# Patient Record
Sex: FEMALE | Race: BLACK OR AFRICAN AMERICAN | Employment: OTHER | URBAN - METROPOLITAN AREA
[De-identification: names, ages, dates, MRNs, and addresses within clinical notes are randomized per-mention and may not be internally consistent; named-entity substitution may affect disease eponyms.]

---

## 2017-11-16 ENCOUNTER — OP HISTORICAL/CONVERTED ENCOUNTER (OUTPATIENT)
Dept: OTHER | Age: 57
End: 2017-11-16

## 2020-08-10 ENCOUNTER — OP HISTORICAL/CONVERTED ENCOUNTER (OUTPATIENT)
Dept: OTHER | Age: 60
End: 2020-08-10

## 2020-09-08 ENCOUNTER — OP HISTORICAL/CONVERTED ENCOUNTER (OUTPATIENT)
Dept: OTHER | Age: 60
End: 2020-09-08

## 2020-09-17 ENCOUNTER — HOSPITAL ENCOUNTER (OUTPATIENT)
Dept: MAMMOGRAPHY | Age: 60
Discharge: HOME | End: 2020-09-17
Attending: FAMILY MEDICINE
Payer: MEDICAID

## 2020-09-17 DIAGNOSIS — R92.1 BREAST CALCIFICATIONS: ICD-10-CM

## 2020-09-17 PROCEDURE — 88305 TISSUE EXAM BY PATHOLOGIST: CPT

## 2020-09-17 PROCEDURE — 77065 DX MAMMO INCL CAD UNI: CPT

## 2020-09-17 PROCEDURE — 19081 BX BREAST 1ST LESION STRTCTC: CPT

## 2020-09-17 RX ORDER — LIDOCAINE HYDROCHLORIDE AND EPINEPHRINE 10; 10 MG/ML; UG/ML
10 INJECTION, SOLUTION INFILTRATION; PERINEURAL ONCE
Status: DISPENSED | OUTPATIENT
Start: 2020-09-17 | End: 2020-09-17

## 2020-09-17 RX ORDER — LIDOCAINE HYDROCHLORIDE 10 MG/ML
3 INJECTION INFILTRATION; PERINEURAL ONCE
Status: DISPENSED | OUTPATIENT
Start: 2020-09-17 | End: 2020-09-17

## 2020-11-06 ENCOUNTER — OFFICE VISIT (OUTPATIENT)
Dept: SURGERY | Age: 60
End: 2020-11-06
Payer: MEDICAID

## 2020-11-06 VITALS
TEMPERATURE: 96.2 F | WEIGHT: 189.6 LBS | HEIGHT: 65 IN | BODY MASS INDEX: 31.59 KG/M2 | SYSTOLIC BLOOD PRESSURE: 180 MMHG | HEART RATE: 90 BPM | DIASTOLIC BLOOD PRESSURE: 102 MMHG | RESPIRATION RATE: 16 BRPM

## 2020-11-06 DIAGNOSIS — R92.8 ABNORMAL MAMMOGRAPHY: ICD-10-CM

## 2020-11-06 DIAGNOSIS — Z12.31 SCREENING MAMMOGRAM, ENCOUNTER FOR: ICD-10-CM

## 2020-11-06 DIAGNOSIS — N64.89 RADIAL SCAR OF BREAST: Primary | ICD-10-CM

## 2020-11-06 PROCEDURE — 99204 OFFICE O/P NEW MOD 45 MIN: CPT | Performed by: SURGERY

## 2020-11-06 NOTE — PROGRESS NOTES
1. Have you been to the ER, urgent care clinic since your last visit? Hospitalized since your last visit? No    2. Have you seen or consulted any other health care providers outside of the [address] [place] [address] since your last visit? Include any pap smears or colon screening.  No       New patient consult for abnormal mammogram.

## 2020-11-06 NOTE — LETTER
11/16/20 Patient: Alfonso Whipple  
YOB: 1960 Date of Visit: 11/6/2020 Karla Charles MD 
[address] [facility] [address] VIA Facsimile: [phone] Dear Karla Charles MD, Thank you for referring Ms. Alfonso Whipple to [facility] for evaluation. My notes for this consultation are attached. If you have questions, please do not hesitate to call me. I look forward to following your patient along with you. Sincerely, Walter Cordero MD

## 2020-11-10 ENCOUNTER — TRANSCRIBE ORDER (OUTPATIENT)
Dept: SCHEDULING | Age: 60
End: 2020-11-10

## 2020-11-10 DIAGNOSIS — Z12.31 SCREENING MAMMOGRAM FOR HIGH-RISK PATIENT: Primary | ICD-10-CM

## 2020-11-16 NOTE — PROGRESS NOTES
This is the first [address] [place] [address] visit for this 56y.o.-year-old woman who presents with left abnormal mammogram      HISTORY OF PRESENT ILLNESS: Ms Lilly Velasco is a 56y.o.-year-old woman who presents for left abnormal mammogram. She underwent biopsy of the left breast calcifications, which demonstrated a small radial scar. She denies any palpable masses, nipple discharge, skin changes, or axillary adenopathy. She denies breast pain. She does not have any significant past history for breast diseases or breast biopsy. Breast cancer risk factors:  Menarche at age 15    Age at first live birth: 14  postmenopausal.  OCP use: admits to  HRT use: denies  Infertility treatment:  denies    FAMILY HISTORY:  No cancers    History reviewed. No pertinent past medical history. History reviewed. No pertinent surgical history.       Social History     Socioeconomic History    Marital status: SINGLE     Spouse name: Not on file    Number of children: Not on file    Years of education: Not on file    Highest education level: Not on file   Tobacco Use    Smoking status: Never Smoker    Smokeless tobacco: Never Used         Not on File            REVIEW OF SYSTEMS:  General: normal, no unintentional weight loss  HEENT: normal, no lymphadenopathy  Cardiovascular: normal, no chest pain  Respiratory: no cough, shortness of breath, or wheezing  BREAST: no complaints  GI: normal, no blood in urine or stool  : normal, no hematuria  Musculoskeletal: no back pain  Integumentary: no abnormal skin lesions  Neuro: no memory changes, dizziness, loss of consciousness  Psych: no mood disorders, feels safe in a relationship      PHYSICAL EXAM:  Patient is a 56y.o.-year-old woman  General Appearance: healthy-appearing, no acute distress, ambulating normally  Head: normocephalic  Neck: supple and no masses  Lymph Nodes: no cervical LAD, supraclavicular LAD, or axillary LAD    BREASTS: symmetric  RIGHT BREAST: no erythema, induration, tenderness, ecchymosis, skin changes, abnormal secretions, or axillary lymphadenopathy and normal nipple appearance, no masses  LEFT BREAST: no erythema, induration, tenderness, ecchymosis, skin changes, abnormal secretions, or axillary lymphadenopathy and normal nipple appearance, no masses    Lungs: no dyspnea  Back: normal curvature  Abdomen: soft and no tenderness, no hepatomegaly, no splenomegaly  Skin: no jaundice  Musculoskeletal: Extremities w no edema, normal motor strength, normal movement of all extremities  Neurologic: Cranial Nerves: grossly intact. Sensation: grossly intact  Psychiatric: good judgement and insight, active and alert and normal mood      RADIOLOGIC WORK-UP: Radiology films and reports were reviewed. 9.8.2020 left diagnostic mammogram, BIRADS-4 - there is a group microcalcifications in the 12:00 posterior depth of the left breas      PATHOLOGY: Review of pathology shows 9.17.2020 left breast stereotactic biopsy: small radial scar w microcalcifications      IMPRESSION:  56y.o.-year old woman with left abnormal mammogram, benign high risk lesion on biopsy    DISCUSSION AND PLAN:  I discussed the pathology with the patient and advised her that the results are benign. There are no additional diagnostic or therapeutic interventions indicated at this time. She is doing well postprocedurally. I recommended that she continue usual care with her regular physician, and return to see me for re-evaluation at the time of her next mammogram    I have placed this order for next October 2021. She was encouraged to call the office if she noticed a change or had additional questions. All her questions were answered to her satisfaction. This encounter lasted 45 minutes and > 50% was devoted to a discussion of management options.

## 2021-02-18 ENCOUNTER — APPOINTMENT (OUTPATIENT)
Dept: PHYSICAL THERAPY | Age: 61
End: 2021-02-18
Payer: MEDICAID

## 2021-02-26 ENCOUNTER — HOSPITAL ENCOUNTER (OUTPATIENT)
Dept: PHYSICAL THERAPY | Age: 61
Discharge: HOME | End: 2021-02-26
Payer: MEDICAID

## 2021-02-26 PROCEDURE — 97110 THERAPEUTIC EXERCISES: CPT

## 2021-02-26 PROCEDURE — 97162 PT EVAL MOD COMPLEX 30 MIN: CPT

## 2021-02-26 NOTE — PROGRESS NOTES
[address] [address] [facility] [address] Suite [facility], [address] [place] [address]  Ph: [phone]    Fax: [phone]    Initial Evaluation/Plan of Care/Statement of Necessity for Physical Therapy Services     Patient name: Colonel Cerna   : 1960  [x]  Patient  Verified Provider#: [id number]    Start of Care/Date:2021         Referral source: Lucho Durbin MD Return visit to MD: 3/4/2021 sees the Ortho specialist      Medical/Treatment Diagnosis: Pain in right shoulder [M25.511]    Payor: Joan Escort / Plan: [facility] / Product Type: Managed Care Medicaid /       Prior Hospitalization: see medical history     Comorbidities: see intake summary   Prior Level of Function: Indep with no issues using the R arm   Medications: Verified on Patient Summary List          Patient / Family readiness to learn indicated by: asking questions, trying to perform skills and interest  Persons(s) to be included in education: patient (P)  Barriers to Learning/Limitations: None  Patient Self Reported Health Status: fair  Rehabilitation Potential: good  Previous Treatment/Compliance: Shoulder manipulation and PT   PMHx/Surgical Hx: see intake summary; R frozen shoulder   Work Hx:  Retired   Living Situation: Lives with family   Barriers to progress: none  Motivation: good   Substance use: none  Cognition: A & O x 4  Onset Date: 2020     In time:11:20am  Out time: 12:15pm Total Treatment Time (min): 55   Visit #:   SUBJECTIVE  Mechanism of Injury: Insidious onset  Pt reports gradual onset of R shoulder pain dating back to 2020. Pt reports worsening symptoms over the past few months. Pt reports she thinks the shoulder started hurting after getting the flu and pneumonia shots. H.o. R frozen shoulder dating back 10 years ago in which she had to get the shoulder manipulated.   Also h.o. neck pain, was diagnosed with cervical arthritis. PAIN:  Area of pain: R shoulder pain    Pain Level (0-10 scale)  At rest: 0 With activity: 10+   Worst: 10+   Least: 0  Things that worsen pain: Depending on how she moves and at night (ie. Sleeping on R side or stomach). Lack of overhead motion and lifting objects overhead. Using the hand for more than 5 minutes causes aching/throbbing pain up the arm. Things that ease pain: Aleve, rubbing cream, and heat   Pain Level (0-10 scale) pre treatment: 0 at rest  Pain Level (0-10 scale) post treatment: 0 at rest    OBJECTIVE  15 min Therapeutic Exercise:  [x] See flow sheet :   Rationale: increase ROM to improve the patients ability to move the R arm without R shoulder pain   With   [x] TE   [] TA   [] Neuro   [] SC   [] other: Patient Education: [x] Review HEP    [] Progressed/Changed HEP based on:   [] positioning   [] body mechanics   [] transfers   [] heat/ice application    [] other:      Objective/Functional Measures including ROM/MMT:   Physical Findings   Ortho:   Posture:  Rounded shoulders   Palpation: TTP subacromial space, deltoids, bicep tendon and bicep;   Gross findings:  Gross myofascial tightness throughout the cervical spine and R shoulder complex alvaro pec and anterior deltoid   Specific joints: *normal values in ()    SHOULDER                                         AROM   PROM            MMT   R L R L R L   Flexion (180) 55 P!  92  3-    Extension (60) 35        Abduction (180) 32  56  2+    Adduction (0)         IR (70) To R buttock  30  NT    ER (90) To top of R shoulder  25  NT    Horizontal Abduction (130)         Horizontal Adduction (45)         Additional comments: ER to R side of neck; IR to R buttock         SPINE:   CERVICAL                                            Additional comments: some pulling along the R UT with L rotation.   Rotation ROM R48, L40   Strength: NT  Special Tests: NT  ASSESSMENT/Changes in Function:   Pt referred to PT services with diagnosis of R adhesive capsulitis. Signs and symptoms are consistent with this diagnosis. Pt will benefit from skilled PT services to assist in pain reduction and maximizing R shoulder mobility. Problem List/Impairments: pain affecting function, decrease ROM, decrease strength and decrease flexibility/ joint mobility  Treatment Plan may include any combination of the following: Therapeutic exercise, Neuromuscular re-education, Manual therapy and Patient education  Patient/ Caregiver education and instruction: exercises  Frequency / Duration: Patient to be seen 2 times per week for 12 treatments. Certification Dates: 2/26/2021 - 5/26/2021  Patient Goal(s): get rid of R shoulder pain.     [] Met [] Not met [] Partially met   Short Term Goals: To be accomplished in 4-6 treatments. 1. Indep with a HEP to assist in rehab progression. [] Met [] Not met [] Partially met    2. 10-20 deg improvement in R shoulder flexion and abduction. [] Met [] Not met [] Partially met    3. Max R shoulder pain 5/10. [] Met [] Not met [] Partially met      Long Term Goals: To be accomplished in 8-12 treatments. 1.  Pt will be able to sleep on her R side and/or stomach without R shoulder pain. [] Met [] Not met [] Partially met    2. Pt will be able to reach overhead to comb hair without difficulty. [] Met [] Not met [] Partially met    3. Pt will be able to don shirts without difficulty. [] Met [] Not met [] Partially met    4. Pt will be able to place objects on overhead shelf without difficulty. [] Met [] Not met [] Partially met      [x]  Plan of care has been reviewed with PTA. The Plan of Care is based on information from the initial evaluation. Elisa Smith, PT, DPT 2/26/2021   ________________________________________________________________________    I certify that the above Therapy Services are being furnished while the patient is under my care.  I agree with the treatment plan and certify that this therapy is necessary.     Physician's Signature:_______________________________________________  Date:______________Time: ___________     Saturnino Taylor MD

## 2021-03-01 ENCOUNTER — HOSPITAL ENCOUNTER (OUTPATIENT)
Dept: PHYSICAL THERAPY | Age: 61
Discharge: HOME | End: 2021-03-01
Payer: MEDICAID

## 2021-03-01 PROCEDURE — 97014 ELECTRIC STIMULATION THERAPY: CPT

## 2021-03-01 PROCEDURE — 97140 MANUAL THERAPY 1/> REGIONS: CPT

## 2021-03-01 PROCEDURE — 97110 THERAPEUTIC EXERCISES: CPT

## 2021-03-01 NOTE — PROGRESS NOTES
PT DAILY TREATMENT NOTE - [facility]     Patient Name: Michael November  Date:3/1/2021  : 1960  [x]  Patient  Verified  Payor: Tre Vargas / Plan: [address] [id number] / Product Type: Managed Care Medicaid /    Treatment Area: Pain in right shoulder [M25.511]   Next MD APPT:   In time: 09:05am  Out time:10:10   Total Treatment Time (min): 60  Total Timed Codes (min): 45  1:1 Treatment Time ( [address] only): 45  Visit #: 2  Visit count could not be calculated. Make sure you are using a visit which is associated with an episode. SUBJECTIVE  Pain Level (0-10 scale) pre treatment: 0/10 ->3/10 with movement Pain Level (0-10 scale) post treatment: 0/10 as long as she doesn't lift arm  Any medication changes, allergies to medications, adverse drug reactions, diagnosis change, or new procedure performed?: [] No    [] Yes (see summary sheet for update)  Subjective functional status/changes:   [] No changes reported  Patient reports no pain at this time, she took pain meds and it also dependeds on how she move the shoulder.      OBJECTIVE    Modality rationale: decrease edema, decrease inflammation, decrease pain, increase tissue extensibility and increase muscle contraction/control to improve the patients ability to increased shoulder active ROM   Min Type Additional Details   15 [x] Estim: []UnAtt   []Att       []TENS instruct                  [x]IFC  []Premod   []NMES                     []Other:  []w/US   []w/ice   [x]w/heat  Position:supine bolster under legs  Location:R shoulder     []  Ice     []  Heat  []  Ice massage Position:  Location:    []  Traction: [] Cervical       []Lumbar                       [] Prone          []Supine                       []Intermittent   []Continuous Lbs:  []w/heat  []W/heat and Estim    []  Ultrasound: []Continuous   [] Pulsed at:                           []1MHz   []3MHz Location:  W/cm2:      [] Skin assessment post-treatment:   []intact  []redness- no adverse reaction   []redness - adverse reaction:   30 min Therapeutic Exercise:  [] See flow sheet :   Rationale: increase ROM, increase strength and increase proprioception to improve the patients ability to raise arm pain free  15 min Manual Therapy: soft tissue to R UT levator area with rook tool and gentle grade 1 mobilization with oscillation      Rationale: decrease pain, increase ROM, increase tissue extensibility, decrease trigger points and increase postural awareness to improve the patients ability to improve range of motion to with ADL's      With   [x] TE   [] TA   [] Neuro   [] SC   [] other: Patient Education: [x] Review HEP    [] Progressed/Changed HEP based on:   [] positioning   [] body mechanics   [] transfers   [] Use of heat/ice    [] other:          Other Objective/Functional Measures:   POC initiated   Added pulleys, cervical ROM and pendulum     ASSESSMENT/Changes in Function:   Patient presents with decreased A/PROM in flexion, abduction and ER, increased trunk compensation noted, verbal./tactile cues required for stability with exercises. Patient educated about frozen shoulder and progress with exercises slowly. With neck SB stretch patient reports tingling sensation going down the arm. New HEP program was provided with gentle ROM activities. Reports relief post ES and heat. Patient will continue to benefit from skilled PT services to modify and progress therapeutic interventions, address functional mobility deficits, address ROM deficits, address strength deficits, analyze and address soft tissue restrictions, analyze and cue movement patterns, analyze and modify body mechanics/ergonomics and assess and modify postural abnormalities to attain remaining goals. []  See Plan of Care  []  See progress note/recertification  []  See Discharge Summary       GOALS/Progress towards goals:    Short Term Goals: To be accomplished in 4-6 treatments.   1. Indep with a HEP to assist in rehab progression. []? Met []? Not met []? Partially met    2. 10-20 deg improvement in R shoulder flexion and abduction. []? Met []? Not met []? Partially met    3. Max R shoulder pain 5/10. []? Met []? Not met []? Partially met       Long Term Goals: To be accomplished in 8-12 treatments. 1.  Pt will be able to sleep on her R side and/or stomach without R shoulder pain. []? Met []? Not met []? Partially met    2. Pt will be able to reach overhead to comb hair without difficulty. []? Met []? Not met []? Partially met    3. Pt will be able to don shirts without difficulty. []? Met []? Not met []? Partially met    4. Pt will be able to place objects on overhead shelf without difficulty. []? Met []? Not met []?  Partially met       PLAN  []  Upgrade activities as tolerated     []  Continue plan of care  []  Update interventions per flow sheet       []  Discharge due to:_  []  Other:_      Gypsy Espana, PT, DPT 3/1/2021

## 2021-03-03 ENCOUNTER — HOSPITAL ENCOUNTER (OUTPATIENT)
Dept: PHYSICAL THERAPY | Age: 61
Discharge: HOME | End: 2021-03-03
Payer: MEDICAID

## 2021-03-03 PROCEDURE — 97014 ELECTRIC STIMULATION THERAPY: CPT

## 2021-03-03 PROCEDURE — 97110 THERAPEUTIC EXERCISES: CPT

## 2021-03-03 NOTE — PROGRESS NOTES
PT DAILY TREATMENT NOTE - [facility]     Patient Name: Geovanna Lopez  Date:3/3/2021  : 1960  [x]  Patient  Verified  Payor: Ganesh Caputo / Plan: [address] [id number] / Product Type: Managed Care Medicaid /    Treatment Area: Pain in right shoulder [M25.511]   Next MD APPT:   In time: 09:20am  Out time:10:16  Total Treatment Time (min): 56  Total Timed Codes (min): 40  1:1 Treatment Time ( only): 40  Visit #: 312  Visit count could not be calculated. Make sure you are using a visit which is associated with an episode. SUBJECTIVE  Pain Level (0-10 scale) pre treatment: 0/10 ->3/10 with movement  Pain Level (0-10 scale) post treatment: 0/10 as long as she doesn't lift arm  Any medication changes, allergies to medications, adverse drug reactions, diagnosis change, or new procedure performed?: [] No    [] Yes (see summary sheet for update)  Subjective functional status/changes:   [] No changes reported  Patient stated she does keep her arm across her chest most of the time due to pain  But will start to move it more. She reports she had a manipulation to her shoulder once before . \"It's been awhile but I believe it was the same arm. \" Patient stated she may like it better if someone else stretches instead of her.     OBJECTIVE    Modality rationale: decrease edema, decrease inflammation, decrease pain, increase tissue extensibility and increase muscle contraction/control to improve the patients ability to increased shoulder active ROM   Min Type Additional Details   15 [x] Estim: []UnAtt   []Att       []TENS instruct                  [x]IFC  []Premod   []NMES                     []Other:  []w/US   []w/ice   [x]w/heat  Position:supine bolster under legs  Location:R shoulder     []  Ice     []  Heat  []  Ice massage Position:  Location:    []  Traction: [] Cervical       []Lumbar                       [] Prone          []Supine                       []Intermittent   []Continuous Lbs:  []w/heat  []W/heat and Estim    []  Ultrasound: []Continuous   [] Pulsed at:                           []1MHz   []3MHz Location:  W/cm2:      [] Skin assessment post-treatment:   []intact  []redness- no adverse reaction   []redness - adverse reaction:   40 min Therapeutic Exercise:  [] See flow sheet :   Rationale: increase ROM, increase strength and increase proprioception to improve the patients ability to raise arm pain free   min Manual Therapy: soft tissue to R UT levator area with rook tool and gentle grade 1 mobilization with oscillation      Rationale: decrease pain, increase ROM, increase tissue extensibility, decrease trigger points and increase postural awareness to improve the patients ability to improve range of motion to with ADL's      With   [x] TE   [] TA   [] Neuro   [] SC   [] other: Patient Education: [x] Review HEP    [] Progressed/Changed HEP based on:   [] positioning   [] body mechanics   [] transfers   [] Use of heat/ice    [] other:          Other Objective/Functional Measures:   POC initiated   Added bike    ASSESSMENT/Changes in Function:   Patient did well with exercises but prefers PROM better. Discussed with patient moving R arm more instead of keeping close to her chest. She also complained of elbow pain . Discussed with patient keeping her elbow bent and arm on her chest is not helping. She had a manipulation to her shoulder years ago and does not want to have another. Patient had a low pain tolerance. Patient will continue to benefit from skilled PT services to modify and progress therapeutic interventions, address functional mobility deficits, address ROM deficits, address strength deficits, analyze and address soft tissue restrictions, analyze and cue movement patterns, analyze and modify body mechanics/ergonomics and assess and modify postural abnormalities to attain remaining goals.    []  See Plan of Care  []  See progress note/recertification  []  See Discharge Summary       GOALS/Progress towards goals:    Short Term Goals: To be accomplished in 4-6 treatments. 1. Indep with a HEP to assist in rehab progression. []? Met []? Not met []? Partially met    2. 10-20 deg improvement in R shoulder flexion and abduction. []? Met []? Not met []? Partially met    3. Max R shoulder pain 5/10. []? Met []? Not met []? Partially met       Long Term Goals: To be accomplished in 8-12 treatments. 1.  Pt will be able to sleep on her R side and/or stomach without R shoulder pain. []? Met []? Not met []? Partially met    2. Pt will be able to reach overhead to comb hair without difficulty. []? Met []? Not met []? Partially met    3. Pt will be able to don shirts without difficulty. []? Met []? Not met []? Partially met    4. Pt will be able to place objects on overhead shelf without difficulty. []? Met []? Not met []?  Partially met       PLAN  []  Upgrade activities as tolerated     []  Continue plan of care  []  Update interventions per flow sheet       []  Discharge due to:_  []  Other:_      Freida Andre, PTA 3/3/2021

## 2021-03-08 ENCOUNTER — HOSPITAL ENCOUNTER (OUTPATIENT)
Dept: PHYSICAL THERAPY | Age: 61
Discharge: HOME | End: 2021-03-08
Payer: MEDICAID

## 2021-03-08 PROCEDURE — 97110 THERAPEUTIC EXERCISES: CPT

## 2021-03-08 PROCEDURE — 97014 ELECTRIC STIMULATION THERAPY: CPT

## 2021-03-08 PROCEDURE — 97140 MANUAL THERAPY 1/> REGIONS: CPT

## 2021-03-08 NOTE — PROGRESS NOTES
PT DAILY TREATMENT NOTE - [facility]     Patient Name: Ericka Remyty  Date:3/8/2021  : 1960  [x]  Patient  Verified  Payor: Marty Adjutant / Plan: [address] [place] [id number] / Product Type: Managed Care Medicaid /    Treatment Area: Pain in right shoulder [M25.511]   Next MD APPT:   In time: 11:35am  Out time: 12:35pm  Total Treatment Time (min): 60  Total Timed Codes (min): 45  1:1 Treatment Time ( only): 45  Visit #:       SUBJECTIVE  Pain Level (0-10 scale) pre treatment: 0/10 at rest   Pain Level (0-10 scale) post treatment: 0/10 at rest   Any medication changes, allergies to medications, adverse drug reactions, diagnosis change, or new procedure performed?: [] No    [] Yes (see summary sheet for update)  Subjective functional status/changes:   [] No changes reported  Pt received injections last Thursday. Pt stating she has less pain from the injection but the motion is still limited. Pt reports she has been able to sleep a little better.   No pain at rest.     OBJECTIVE    Modality rationale: decrease edema, decrease inflammation, decrease pain, increase tissue extensibility and increase muscle contraction/control to improve the patients ability to increased shoulder active ROM   Min Type Additional Details   15 [x] Estim: []UnAtt   []Att       []TENS instruct                  [x]IFC  []Premod   []NMES                     []Other:  []w/US   []w/ice   [x]w/heat  Position:supine bolster under legs  Location:R shoulder     []  Ice     []  Heat  []  Ice massage Position:  Location:    []  Traction: [] Cervical       []Lumbar                       [] Prone          []Supine                       []Intermittent   []Continuous Lbs:  []w/heat  []W/heat and Estim    []  Ultrasound: []Continuous   [] Pulsed at:                           []1MHz   []3MHz Location:  W/cm2:      [] Skin assessment post-treatment:   []intact  []redness- no adverse reaction   []redness - adverse reaction: 35 min Therapeutic Exercise:  [] See flow sheet :   Rationale: increase ROM, increase strength and increase proprioception to improve the patients ability to raise arm pain free  10 min Manual Therapy: GH/AC joint mobs, long axis distraction with PROM      Rationale: decrease pain, increase ROM, increase tissue extensibility, decrease trigger points and increase postural awareness to improve the patients ability to improve range of motion to with ADL's      With   [x] TE   [] TA   [] Neuro   [] SC   [] other: Patient Education: [x] Review HEP    [] Progressed/Changed HEP based on:   [] positioning   [] body mechanics   [] transfers   [] Use of heat/ice    [] other:          Other Objective/Functional Measures: Added ball roll for shoulder flexion and wheel     ASSESSMENT/Changes in Function:   Pt received cortisone injection on 3/4/21. Although reporting less pain, she continues to have significant GH hypomobility. Also noted hypomobility in [facility] joint. Pain at end ranges. Will continue to progress R shoulder ROM as tolerated. Patient will continue to benefit from skilled PT services to modify and progress therapeutic interventions, address functional mobility deficits, address ROM deficits, address strength deficits, analyze and address soft tissue restrictions, analyze and cue movement patterns, analyze and modify body mechanics/ergonomics and assess and modify postural abnormalities to attain remaining goals. []  See Plan of Care  []  See progress note/recertification  []  See Discharge Summary       GOALS/Progress towards goals:    Short Term Goals: To be accomplished in 4-6 treatments. 1. Indep with a HEP to assist in rehab progression. []? Met []? Not met []? Partially met    2. 10-20 deg improvement in R shoulder flexion and abduction. []? Met []? Not met []? Partially met    3. Max R shoulder pain 5/10. []? Met []? Not met []? Partially met       Long Term Goals:  To be accomplished in 8-12 treatments.   1.  Pt will be able to sleep on her R side and/or stomach without R shoulder pain. []? Met []? Not met []? Partially met    2. Pt will be able to reach overhead to comb hair without difficulty. []? Met []? Not met []? Partially met    3. Pt will be able to don shirts without difficulty. []? Met []? Not met []? Partially met    4.  Pt will be able to place objects on overhead shelf without difficulty. []? Met []? Not met []? Partially met       PLAN  [x]  Upgrade activities as tolerated     [x]  Continue plan of care  [x]  Update interventions per flow sheet       []  Discharge due to:_  []  Other:_      Elisa Ortiz, PT, DPT 3/8/2021

## 2021-03-11 ENCOUNTER — HOSPITAL ENCOUNTER (OUTPATIENT)
Dept: PHYSICAL THERAPY | Age: 61
Discharge: HOME | End: 2021-03-11
Payer: MEDICAID

## 2021-03-11 PROCEDURE — 97110 THERAPEUTIC EXERCISES: CPT

## 2021-03-11 PROCEDURE — 97014 ELECTRIC STIMULATION THERAPY: CPT

## 2021-03-11 NOTE — PROGRESS NOTES
PT DAILY TREATMENT NOTE - [facility]     Patient Name: Geovanna Lopez  Date:3/11/2021  : 1960  [x]  Patient  Verified  Payor: Jordiarlineyennifer Arceo / Plan: [address] [place] [id number] / Product Type: Managed Care Medicaid /    Treatment Area: Pain in right shoulder [M25.511]   Next MD APPT:   In time: 11:20 am  Out time: 12:25 pm  Total Treatment Time (min):65 min  Total Timed Codes (min):45  1:1 Treatment Time ( only): 39  Visit #:       SUBJECTIVE  Pain Level (0-10 scale) pre treatment:1/10    Pain Level (0-10 scale) post treatment: 0/10    Any medication changes, allergies to medications, adverse drug reactions, diagnosis change, or new procedure performed?: [] No    [] Yes (see summary sheet for update)  Subjective functional status/changes:   [] No changes reported  Pt  Stated since the injections she is feeling better pain wise. Also that she is trying to use it more at home. She stated the doctor told her she might need another injection in 6 weeks. Discussed with patient propping her arm up on pillows to get more ROM instead of to her side.       OBJECTIVE    Modality rationale: decrease edema, decrease inflammation, decrease pain, increase tissue extensibility and increase muscle contraction/control to improve the patients ability to increased shoulder active ROM   Min Type Additional Details   15 [x] Estim: []UnAtt   []Att       []TENS instruct                  [x]IFC  []Premod   []NMES                     []Other:  []w/US   []w/ice   [x]w/heat  Position:supine bolster under legs  Location:R shoulder     []  Ice     []  Heat  []  Ice massage Position:  Location:    []  Traction: [] Cervical       []Lumbar                       [] Prone          []Supine                       []Intermittent   []Continuous Lbs:  []w/heat  []W/heat and Estim    []  Ultrasound: []Continuous   [] Pulsed at:                           []1MHz   []3MHz Location:  W/cm2:      [x] Skin assessment post-treatment:   [x]intact  []redness- no adverse reaction   []redness - adverse reaction:   45 min Therapeutic Exercise:  [] See flow sheet :   Rationale: increase ROM, increase strength and increase proprioception to improve the patients ability to raise arm pain free   min Manual Therapy: GH/AC joint mobs, long axis distraction with PROM      Rationale: decrease pain, increase ROM, increase tissue extensibility, decrease trigger points and increase postural awareness to improve the patients ability to improve range of motion to with ADL's      With   [x] TE   [] TA   [] Neuro   [] SC   [] other: Patient Education: [x] Review HEP    [] Progressed/Changed HEP based on:   [] positioning   [] body mechanics   [] transfers   [] Use of heat/ice    [] other:          Other Objective/Functional Measures: Added cane  And abd with pulleys    ASSESSMENT/Changes in Function:   Discussed with patient propping arm up on pillows to increase ROM slowly. She is moving arm more at home below 90 degrees. She is still very tight in all planes. Reviewed canes exercises for HEP. She is very guarded and compensates due to tightness. Patient will continue to benefit from skilled PT services to modify and progress therapeutic interventions, address functional mobility deficits, address ROM deficits, address strength deficits, analyze and address soft tissue restrictions, analyze and cue movement patterns, analyze and modify body mechanics/ergonomics and assess and modify postural abnormalities to attain remaining goals. []  See Plan of Care  []  See progress note/recertification  []  See Discharge Summary       GOALS/Progress towards goals:    Short Term Goals: To be accomplished in 4-6 treatments. 1. Indep with a HEP to assist in rehab progression. [x]? Met []? Not met []? Partially met    2. 10-20 deg improvement in R shoulder flexion and abduction. []? Met []? Not met []? Partially met    3. Max R shoulder pain 5/10. []?  Met []? Not met [x]? Partially met  Just received  Cortisone injections to R shoulder 3/4/21     Long Term Goals: To be accomplished in 8-12 treatments. 1.  Pt will be able to sleep on her R side and/or stomach without R shoulder pain. []? Met []? Not met []? Partially met    2. Pt will be able to reach overhead to comb hair without difficulty. []? Met [x]? Not met []? Partially met    3. Pt will be able to don shirts without difficulty. []? Met []? Not met []? Partially met    4. Pt will be able to place objects on overhead shelf without difficulty. []? Met []? Not met []?  Partially met       PLAN  [x]  Upgrade activities as tolerated     [x]  Continue plan of care  [x]  Update interventions per flow sheet       []  Discharge due to:_  []  Other:_      Freida Andre, PTA 3/11/2021

## 2021-03-17 ENCOUNTER — APPOINTMENT (OUTPATIENT)
Dept: PHYSICAL THERAPY | Age: 61
End: 2021-03-17
Payer: MEDICAID

## 2021-03-18 ENCOUNTER — HOSPITAL ENCOUNTER (OUTPATIENT)
Dept: PHYSICAL THERAPY | Age: 61
Discharge: HOME | End: 2021-03-18
Payer: MEDICAID

## 2021-03-18 PROCEDURE — 97110 THERAPEUTIC EXERCISES: CPT

## 2021-03-18 NOTE — PROGRESS NOTES
PT DAILY TREATMENT NOTE - [facility]     Patient Name: Tessa Mcgee  Date:3/18/2021  : 1960  [x]  Patient  Verified  Payor: Erna Peng / Plan: [address] [facility] [address] / Product Type: Managed Care Medicaid /    Treatment Area: Pain in right shoulder [M25.511]   Next MD APPT: 21  In time: 2:35pm  Out time: 3:30p  Total Treatment Time (min):55  Total Timed Codes (min): 48  1:1 Treatment Time ([facility] only): 48  Visit #:     SUBJECTIVE  Pain Level (0-10 scale) pre treatment: 2/10    Pain Level (0-10 scale) post treatment: 0/10    Any medication changes, allergies to medications, adverse drug reactions, diagnosis change, or new procedure performed?: [] No    [] Yes (see summary sheet for update)  Subjective functional status/changes:   [] No changes reported  Patient reports that overall she is feeling better and better. She is not taking any pain medication now and her pain is about a 2/10. She notices that she is able to pull down her pants easier to go to the bathroom, sleep better at night,  and carry things like groceries easier. \"The treatment and the shot have helped me\". She got her 1st Covid Vaccine yesterday, but feels pretty good today. \"It's just a little sore\".   OBJECTIVE    Modality rationale: decrease edema, decrease inflammation, decrease pain, increase tissue extensibility and increase muscle contraction/control to improve the patients ability to increased shoulder active ROM   Min Type Additional Details   0 [] Estim: []UnAtt   []Att       []TENS instruct                  []IFC  []Premod   []NMES                     []Other:  []w/US   []w/ice   []w/heat  Position:supine bolster under legs  Location:R shoulder     []  Ice     []  Heat  []  Ice massage Position:  Location:    []  Traction: [] Cervical       []Lumbar                       [] Prone          []Supine                       []Intermittent   []Continuous Lbs:  []w/heat  []W/heat and Estim    [] Ultrasound: []Continuous   [] Pulsed at:                           []1MHz   []3MHz Location:  W/cm2:    [] Skin assessment post-treatment:   []intact  []redness- no adverse reaction   []redness - adverse reaction:     40 min Therapeutic Exercise:  [x] See flow sheet :   Rationale: increase ROM, increase strength and increase proprioception to improve the patients ability to raise arm pain free  8 min Manual Therapy: Trigger point release as she stretched     Rationale: decrease pain, increase ROM, increase tissue extensibility, decrease trigger points and increase postural awareness to improve the patients ability to improve range of motion to with ADL's    With   [x] TE   [] TA   [] Neuro   [] SC   [] other: Patient Education: [x] Review HEP    [] Progressed/Changed HEP based on:   [] positioning   [] body mechanics   [] transfers   [] Use of heat/ice    [] other:          Other Objective/Functional Measures: Reviewed HEP and importance of doing these 4-5x a day; started behind back pulleys  ASSESSMENT/Changes in Function:   Encouraged pt to consider buying home pulleys and do these 3-4x a day, including reaching behind back. Reviewed ways she can do more prolonged passive stretches at home including wall slides with turn away, EROT stretch on door, reach behind back and try to grab with L hand, supine and hold R hand behind her head. LARGE trigger points and did some release to these as she stretched today. Discussed timing her pain medication with her stretches for the best results, and how to hold the stretches longer. Patient deferred modalities because she had no pain following the stretches. Patient seems to have some shoulder/hand syndrome with decrease R hand muscle tone and swollen joints. She was shown how to work her hand using play dough or wringing out a rag.         Patient will continue to benefit from skilled PT services to modify and progress therapeutic interventions, address functional mobility deficits, address ROM deficits, address strength deficits, analyze and address soft tissue restrictions, analyze and cue movement patterns, analyze and modify body mechanics/ergonomics and assess and modify postural abnormalities to attain remaining goals. GOALS/Progress towards goals:  Short Term Goals: To be accomplished in 4-6 treatments. 1. Indep with a HEP to assist in rehab progression. [x]? Met []? Not met []? Partially met    2. 10-20 deg improvement in R shoulder flexion and abduction. []? Met []? Not met []? Partially met    3. Max R shoulder pain 5/10. []? Met []? Not met [x]? Partially met  Just received  Cortisone injections to R shoulder 3/4/21     Long Term Goals: To be accomplished in 8-12 treatments. 1.  Pt will be able to sleep on her R side and/or stomach without R shoulder pain. []? Met []? Not met [x]? Partially met  3/18 sleeping better now since the injection  2. Pt will be able to reach overhead to comb hair without difficulty. []? Met [x]? Not met []? Partially met    3. Pt will be able to don shirts without difficulty. []? Met []? Not met []? Partially met    4. Pt will be able to place objects on overhead shelf without difficulty. []? Met []? Not met []?  Partially met       PLAN  [x]  Upgrade activities as tolerated     [x]  Continue plan of care  [x]  Update interventions per flow sheet       []  Discharge due to:_  []  Other:_      Marleny Andrews, PT 3/18/2021

## 2021-03-19 ENCOUNTER — HOSPITAL ENCOUNTER (OUTPATIENT)
Dept: PHYSICAL THERAPY | Age: 61
Discharge: HOME | End: 2021-03-19
Payer: MEDICAID

## 2021-03-19 PROCEDURE — 97140 MANUAL THERAPY 1/> REGIONS: CPT

## 2021-03-19 PROCEDURE — 97110 THERAPEUTIC EXERCISES: CPT

## 2021-03-19 NOTE — PROGRESS NOTES
PT DAILY TREATMENT NOTE - [facility]     Patient Name: Catina Olsen  Date:3/19/2021  : 1960  [x]  Patient  Verified  Payor: Vannessa Boyd / Plan: [address] [place] [id number] / Product Type: Managed Care Medicaid /    Treatment Area: Pain in right shoulder [M25.511]   Next MD APPT: 21  In time: 11:25am  Out time: 12:25pm  Total Treatment Time (min):60  Total Timed Codes (min): 45  1:1 Treatment Time ( [address] only): 39  Visit #:       SUBJECTIVE  Pain Level (0-10 scale) pre treatment: 0/10    Pain Level (0-10 scale) post treatment: 0/10    Any medication changes, allergies to medications, adverse drug reactions, diagnosis change, or new procedure performed?: [] No    [] Yes (see summary sheet for update)  Subjective functional status/changes:   [] No changes reported  Pt stating she is not in any pain currently. Pt reports her pain is getting better.   Pt reports increase pain first thing in the morning, 8/10, but it gets better when she starts to move around      OBJECTIVE  Modality rationale: increase tissue extensibility to improve the patients ability to raise the R arm    Min Type Additional Details       [] Estim: []Att   []Unatt    []TENS instruct                  []IFC  []Premod   []NMES                     []Other:  []w/US   []w/ice   []w/heat  Position:  Location:       []  Traction: [] Cervical       []Lumbar                       [] Prone          []Supine                       []Intermittent   []Continuous Lbs:  [] before manual  [] after manual  []w/heat    []  Ultrasound: []Continuous   [] Pulsed                       at: []1MHz   []3MHz Location:  W/cm2:    [] Paraffin         Location:   []w/heat   15 []  Ice     [x]  Heat  []  Ice massage Position: R shoulder/scapula  Location:L sidelying    []  Laser  []  Other: Position:  Location:      []  Vasopneumatic Device Pressure:       [] lo [] med [] hi   Temperature:      [x] Skin assessment post-treatment:  [x]intact []redness- no adverse reaction    []redness - adverse reaction:       30 min Therapeutic Exercise:  [x] See flow sheet :   Rationale: increase ROM, increase strength and increase proprioception to improve the patient’s ability to raise arm pain free  15 min Manual Therapy: GH mobs w/ PROM and STM lateral border of scapula, pecs, ant deltoid and UT.     Rationale: decrease pain, increase ROM, increase tissue extensibility, decrease trigger points and increase postural awareness to improve the patient’s ability to improve range of motion to with ADL's    With   [x] TE   [] TA   [] Neuro   [] SC   [] other: Patient Education: [x] Review HEP    [] Progressed/Changed HEP based on:   [] positioning   [] body mechanics   [] transfers   [] Use of heat/ice    [] other:          Other Objective/Functional Measures: added cane IR behind back and cross chest stretch.     ASSESSMENT/Changes in Function:     Significant tightness along the lateral border of the scapula effecting overhead motion.  Pt continues to have significant decrease in R shoulder ROM due to capsule and muscular tightness.  Less overall pain reported.  Will continue to focus on mobs and ROM of R shoulder.    Patient will continue to benefit from skilled PT services to modify and progress therapeutic interventions, address functional mobility deficits, address ROM deficits, address strength deficits, analyze and address soft tissue restrictions, analyze and cue movement patterns, analyze and modify body mechanics/ergonomics and assess and modify postural abnormalities to attain remaining goals.          GOALS/Progress towards goals:  Short Term Goals: To be accomplished in 4-6 treatments.  1. Indep with a HEP to assist in rehab progression. [x]? Met []? Not met []? Partially met    2. 10-20 deg improvement in R shoulder flexion and abduction. []? Met []? Not met []? Partially met    3. Max R shoulder pain 5/10. []? Met []? Not met [x]? Partially met  Just  received  Cortisone injections to R shoulder 3/4/21     Long Term Goals: To be accomplished in 8-12 treatments. 1.  Pt will be able to sleep on her R side and/or stomach without R shoulder pain. []? Met []? Not met [x]? Partially met  3/18 sleeping better now since the injection  2. Pt will be able to reach overhead to comb hair without difficulty. []? Met [x]? Not met []? Partially met    3. Pt will be able to don shirts without difficulty. []? Met []? Not met []? Partially met    4. Pt will be able to place objects on overhead shelf without difficulty. []? Met []? Not met []?  Partially met       PLAN  [x]  Upgrade activities as tolerated     [x]  Continue plan of care  [x]  Update interventions per flow sheet       []  Discharge due to:_  []  Other:_      Shady Nur, PT, DPT 3/19/2021

## 2021-03-23 ENCOUNTER — HOSPITAL ENCOUNTER (OUTPATIENT)
Dept: PHYSICAL THERAPY | Age: 61
Discharge: HOME | End: 2021-03-23
Payer: MEDICAID

## 2021-03-23 PROCEDURE — 97140 MANUAL THERAPY 1/> REGIONS: CPT

## 2021-03-23 PROCEDURE — 97110 THERAPEUTIC EXERCISES: CPT

## 2021-03-23 NOTE — PROGRESS NOTES
PT DAILY TREATMENT NOTE - [facility]     Patient Name: Millie Ramesh  Date:3/23/2021  : 1960  [x]  Patient  Verified  Payor: Nathen Busch / Plan: [address] [place] [id number] / Product Type: Managed Care Medicaid /    Treatment Area: Pain in right shoulder [M25.511]   Next MD APPT: 21  In time: 0301pm  Out time: 0410pm  Total Treatment Time (min): 65  Total Timed Codes (min): 50  1:1 Treatment Time ( only): 50  Visit #:       SUBJECTIVE  Pain Level (0-10 scale) pre treatment: 0/10    Pain Level (0-10 scale) post treatment: 0/10    Any medication changes, allergies to medications, adverse drug reactions, diagnosis change, or new procedure performed?: [] No    [] Yes (see summary sheet for update)  Subjective functional status/changes:   [] No changes reported  Pt continues to report 0/10 pain pre-tx, states she did not have much pain this morning, only bothers her with aggravating motions     OBJECTIVE  Modality rationale: increase tissue extensibility to improve the patients ability to raise the R arm    Min Type Additional Details       [] Estim: []Att   []Unatt    []TENS instruct                  []IFC  []Premod   []NMES                     []Other:  []w/US   []w/ice   []w/heat  Position:  Location:       []  Traction: [] Cervical       []Lumbar                       [] Prone          []Supine                       []Intermittent   []Continuous Lbs:  [] before manual  [] after manual  []w/heat    []  Ultrasound: []Continuous   [] Pulsed                       at: []1MHz   []3MHz Location:  W/cm2:    [] Paraffin         Location:   []w/heat   15 []  Ice     [x]  Heat  []  Ice massage Position: R shoulder/scapula  Location:supine with legs propped    []  Laser  []  Other: Position:  Location:      []  Vasopneumatic Device Pressure:       [] lo [] med [] hi   Temperature:      [x] Skin assessment post-treatment:  [x]intact []redness- no adverse reaction    []redness  adverse reaction:       40 min Therapeutic Exercise:  [x] See flow sheet :   Rationale: increase ROM, increase strength and increase proprioception to improve the patients ability to raise arm pain free  10 min Manual Therapy: [address] mobs w/ PROM and STM lateral border of scapula, lats, subscapularis, rhomboids. Rationale: decrease pain, increase ROM, increase tissue extensibility, decrease trigger points and increase postural awareness to improve the patients ability to improve range of motion to with ADL's    With   [x] TE   [] TA   [] Neuro   [] SC   [] other: Patient Education: [x] Review HEP    [] Progressed/Changed HEP based on:   [] positioning   [] body mechanics   [] transfers   [] Use of heat/ice    [] other:          Other Objective/Functional Measures:      ASSESSMENT/Changes in Function:    Muscle turgor noted along lateral scapula border, pecs, subscapularis, and lats. Minimal rotation (IR/ER) passive/active. Muscle tightness and joint capsule restriction contributing to loss of ROM. Pt does continue without pain. Patient will continue to benefit from skilled PT services to modify and progress therapeutic interventions, address functional mobility deficits, address ROM deficits, address strength deficits, analyze and address soft tissue restrictions, analyze and cue movement patterns, analyze and modify body mechanics/ergonomics and assess and modify postural abnormalities to attain remaining goals. GOALS/Progress towards goals:  Short Term Goals: To be accomplished in 4-6 treatments. 1. Indep with a HEP to assist in rehab progression. [x]? Met []? Not met []? Partially met    2. 10-20 deg improvement in R shoulder flexion and abduction. []? Met []? Not met []? Partially met    3. Max R shoulder pain 5/10. []? Met []? Not met [x]? Partially met  Just received  Cortisone injections to R shoulder 3/4/21     Long Term Goals: To be accomplished in 8-12 treatments.    1.  Pt will be able to sleep on her R side and/or stomach without R shoulder pain. []? Met []? Not met [x]? Partially met  3/18 sleeping better now since the injection  2. Pt will be able to reach overhead to comb hair without difficulty. []? Met [x]? Not met []? Partially met    3. Pt will be able to don shirts without difficulty. []? Met []? Not met []? Partially met    4. Pt will be able to place objects on overhead shelf without difficulty. []? Met []? Not met []?  Partially met       PLAN  [x]  Upgrade activities as tolerated     [x]  Continue plan of care  [x]  Update interventions per flow sheet       []  Discharge due to:_  [x]  Other:_needs a progress report at next visit      Jack Hood, PT, DPT 3/23/2021

## 2021-03-25 ENCOUNTER — HOSPITAL ENCOUNTER (OUTPATIENT)
Dept: PHYSICAL THERAPY | Age: 61
Discharge: HOME | End: 2021-03-25
Payer: MEDICAID

## 2021-03-25 PROCEDURE — 97110 THERAPEUTIC EXERCISES: CPT

## 2021-03-25 PROCEDURE — 97140 MANUAL THERAPY 1/> REGIONS: CPT

## 2021-03-25 NOTE — PROGRESS NOTES
[address] [address] [facility] [address] [facility] [address] [place] [address]  Ph: [phone]    Fax: [phone]  Therapy Progress Report  Name: Rosaura Valle   : 1960   MD: Brittni Monzon MD     Treatment Diagnosis: Pain in right shoulder [M25.511]  Start of Care: 21 Visits from Start of Care: 9  Missed Visits: 0  Problem List/Impairments: pain affecting function, decrease ROM, decrease strength, decrease ADL/ functional abilitiies and decrease flexibility/ joint mobility  Summary of Care/Goals:  Pt has made fair progress with PT services. Note improvement in ROM above both active and passive elevation, functional IR/ER. However, ROM continues to be non-functional. Note restrictions in capsular pattern ER>ABD>IR/FLEX. Significant muscle tightness and soft tissue restrictions around the scapula and capsular tightness at [address] joint. Pt is progressing toward goals. Patient will continue to benefit from skilled PT services to modify and progress therapeutic interventions, address functional mobility deficits, address ROM deficits, address strength deficits, analyze and address soft tissue restrictions, analyze and cue movement patterns, analyze and modify body mechanics/ergonomics and assess and modify postural abnormalities to attain remaining goals. SHOULDER                                         AROM                        PROM                       MMT    R L R L R L   Flexion (180) 78    3-     Extension (60) 35             Abduction (180) 62   90   2+     Adduction (0)               IR (70) coccyx  T12 30   4 4   ER (90) C3  T3 0   4  4   Additional comments: no pain                                          Statistically significant improvement in active/passive flexion (20 degrees better), abduction (30 degrees), improved functional elevation and functional IR/ER. Short Term Goals: To be accomplished in 4-6 treatments.   1. Indep with a HEP to assist in rehab progression.               [x]? ? Met []? ? Not met []? ? Partially met    2. 10-20 deg improvement in R shoulder flexion and abduction.               []?? Met []? ? Not met []? ? Partially met    3. Max R shoulder pain 5/10.               []?? Met []? ? Not met [x]? ? Partially met  Reports typically max pain 5/10, mostly at night   [address] be accomplished in 8-12 treatments. 1.  Pt will be able to sleep on her R side and/or stomach without R shoulder pain. []?? Met []? ? Not met [x]? ? Partially met Still sleeping on her L side but is sleeping better now   2. Pt will be able to reach overhead to comb hair without difficulty.               []?? Met []? ? Not met [x]? ? Partially met   Improving  3. Pt will be able to don shirts without difficulty.               []?? Met []? ? Not met [x]? ? Partially met  Improving  4.  Pt will be able to place objects on overhead shelf without difficulty. []?? Met [x]? ? Not met []? ? Partially met   Still using L arm for tasks   Recommendations: Recommend continued outpt PT services per POC  Frequency / Duration: Patient to be seen 2 times per week for 12 treatments. Certification Period (if applicable): 9/35/67 - 7/98/14  Treatment Plan may include any combination of the following: Therapeutic exercise, Therapeutic activities, Neuromuscular re-education, Manual therapy, Patient education and Self Care training  Patient/ Caregiver education and instruction: self care and exercises  [x]  Plan of care has been reviewed with PTA. Rosy Badillo, PT, DPT 3/25/2021     ________________________________________________________________________     Please retain this original for your records.

## 2021-03-25 NOTE — PROGRESS NOTES
PT DAILY TREATMENT NOTE - [facility]     Patient Name: Logan Galeazzi  Date:3/25/2021  : 1960  [x]  Patient  Verified  Payor: [employer] / Plan: 1 [facility] 270 / Product Type: Managed Care Medicaid /    Treatment Area: Pain in right shoulder [M25.511]   Next MD APPT: 21  In time: 0115pm Out time: 0207pm  Total Treatment Time (min):52  Total Timed Codes (min): 50  1:1 Treatment Time ( only): 50  Visit #:       SUBJECTIVE  Pain Level (0-10 scale) pre treatment: 0/10    Pain Level (0-10 scale) post treatment: 0/10    Any medication changes, allergies to medications, adverse drug reactions, diagnosis change, or new procedure performed?: [x] No    [] Yes (see summary sheet for update)  Subjective functional status/changes:   [] No changes reported  Pt stating her shoulder feels different, looser today. Reports overall that she is getting a little bit better. RUE function with ADLs improved but she still relies heavily on the LUE.     OBJECTIVE    25 min Therapeutic Exercise:  [x] See flow sheet :   Rationale: increase ROM, increase strength and increase proprioception to improve the patients ability to raise arm pain free  25 min Manual Therapy: [address] mobs w/ PROM and STM R UT/LS, subscapularis   Rationale: decrease pain, increase ROM, increase tissue extensibility, decrease trigger points and increase postural awareness to improve the patients ability to improve range of motion to with ADL's    With   [x] TE   [] TA   [] Neuro   [] SC   [] other: Patient Education: [x] Review HEP    [] Progressed/Changed HEP based on:   [] positioning   [] body mechanics   [] transfers   [x] Use of heat/ice    [] other:          Other Objective/Functional Measures:    SHOULDER                                         AROM                        PROM                       MMT    R L R L R L   Flexion (180) 78    3-     Extension (60) 35             Abduction (180) 62   90   2+   Adduction (0)               IR (70) coccyx  T12 30   4 4   ER (90) C3  T3 0   4  4   Additional comments: no pain                                          Statistically significant improvement in active/passive flexion (20 degrees better), abduction (30 degrees), improved functional elevation and functional IR/ER. CERVICAL:  Rotation ROM R56, L48    Palpation:  Decrease TTP noted, still with some tightness around lateral border of scapula, pec muscles,  Subscapularis muscle, lats. ASSESSMENT/Changes in Function:    Pt has made fair progress with PT services. Note improvement in ROM above both active and passive elevation, functional IR/ER. However, ROM continues to be non-functional. Note restrictions in capsular pattern ER>ABD>IR/FLEX. Significant muscle tightness and soft tissue restrictions around the scapula and capsular tightness at [address] joint. Pt is progressing toward goals. Patient will continue to benefit from skilled PT services to modify and progress therapeutic interventions, address functional mobility deficits, address ROM deficits, address strength deficits, analyze and address soft tissue restrictions, analyze and cue movement patterns, analyze and modify body mechanics/ergonomics and assess and modify postural abnormalities to attain remaining goals. GOALS/Progress towards goals:  Short Term Goals: To be accomplished in 4-6 treatments. 1. Indep with a HEP to assist in rehab progression. [x]? Met []? Not met []? Partially met    2. 10-20 deg improvement in R shoulder flexion and abduction. []? Met []? Not met []? Partially met    3. Max R shoulder pain 5/10. []? Met []? Not met [x]? Partially met  Reports typically max pain 5/10, mostly at night   Long Term Goals: To be accomplished in 8-12 treatments. 1.  Pt will be able to sleep on her R side and/or stomach without R shoulder pain. []? Met []? Not met [x]?  Partially met Still sleeping on her L side but is sleeping better now   2. Pt will be able to reach overhead to comb hair without difficulty. []? Met []? Not met [x]? Partially met   Improving  3. Pt will be able to don shirts without difficulty. []? Met []? Not met [x]? Partially met  Improving  4. Pt will be able to place objects on overhead shelf without difficulty. []? Met [x]? Not met []?  Partially met   Still using L arm for tasks     PLAN  [x]  Upgrade activities as tolerated     [x]  Continue plan of care  [x]  Update interventions per flow sheet       []  Discharge due to:_   []  Other:_     Nathen Larsen, PT, DPT 3/25/2021

## 2021-03-30 ENCOUNTER — HOSPITAL ENCOUNTER (OUTPATIENT)
Dept: PHYSICAL THERAPY | Age: 61
Discharge: HOME | End: 2021-03-30
Payer: MEDICAID

## 2021-03-30 PROCEDURE — 97110 THERAPEUTIC EXERCISES: CPT

## 2021-03-30 PROCEDURE — 97140 MANUAL THERAPY 1/> REGIONS: CPT

## 2021-03-30 NOTE — PROGRESS NOTES
PT DAILY TREATMENT NOTE - [facility]     Patient Name: Shady Garcia  Date:3/30/2021  : 1960  [x]  Patient  Verified  Payor: Alexander [employer] / Plan: 1 [facility] 270 / Product Type: Managed Care Medicaid /    Treatment Area: Pain in right shoulder [M25.511]   Next MD APPT: 4/15/21  In time: 1:06pm Out time:1:50pm  Total Treatment Time (min):44  Total Timed Codes (min): 44  1:1 Treatment Time [facility] only):44   Visit #: 10/12      SUBJECTIVE  Pain Level (0-10 scale) pre treatment: 0/10    Pain Level (0-10 scale) post treatment: 0/10    Any medication changes, allergies to medications, adverse drug reactions, diagnosis change, or new procedure performed?: [x] No    [] Yes (see summary sheet for update)  Subjective functional status/changes:   [] No changes reported  Pt reports her shoulder is doing ok. Pt reports her shoulder is usually sore after therapy. OBJECTIVE    29 min Therapeutic Exercise:  [x] See flow sheet :   Rationale: increase ROM, increase strength and increase proprioception to improve the patients ability to raise arm pain free  15 min Manual Therapy: [address] mobs w/ PROM and STM R UT/LS, fredi-scapula muscles    Rationale: decrease pain, increase ROM, increase tissue extensibility, decrease trigger points and increase postural awareness to improve the patients ability to improve range of motion to with ADL's    With   [x] TE   [] TA   [] Neuro   [] SC   [] other: Patient Education: [x] Review HEP    [] Progressed/Changed HEP based on:   [] positioning   [] body mechanics   [] transfers   [x] Use of heat/ice    [] other:          Other Objective/Functional Measures:  Continued to work on [address] mobility and soft tissue release. ASSESSMENT/Changes in Function:    Pt continues to have significant capsular and soft tissue restrictions limiting R shoulder ROM. Pt is making modest improvement in ROM.   She has two more visits then f/u with MD.    Patient will continue to benefit from skilled PT services to modify and progress therapeutic interventions, address functional mobility deficits, address ROM deficits, address strength deficits, analyze and address soft tissue restrictions, analyze and cue movement patterns, analyze and modify body mechanics/ergonomics and assess and modify postural abnormalities to attain remaining goals. GOALS/Progress towards goals:  Short Term Goals: To be accomplished in 4-6 treatments. 1. Indep with a HEP to assist in rehab progression. [x]? Met []? Not met []? Partially met    2. 10-20 deg improvement in R shoulder flexion and abduction. []? Met []? Not met []? Partially met    3. Max R shoulder pain 5/10. []? Met []? Not met [x]? Partially met  Reports typically max pain 5/10, mostly at night     Long Term Goals: To be accomplished in 8-12 treatments. 1.  Pt will be able to sleep on her R side and/or stomach without R shoulder pain. []? Met []? Not met [x]? Partially met Still sleeping on her L side but is sleeping better now   2. Pt will be able to reach overhead to comb hair without difficulty. []? Met []? Not met [x]? Partially met   Improving  3. Pt will be able to don shirts without difficulty. []? Met []? Not met [x]? Partially met  Improving  4. Pt will be able to place objects on overhead shelf without difficulty. []? Met [x]? Not met []?  Partially met   Still using L arm for tasks     PLAN  [x]  Upgrade activities as tolerated     [x]  Continue plan of care  [x]  Update interventions per flow sheet       []  Discharge due to:_   []  Other:_     Mar Chavez, PT, DPT 3/30/2021

## 2021-04-01 ENCOUNTER — APPOINTMENT (OUTPATIENT)
Dept: PHYSICAL THERAPY | Age: 61
End: 2021-04-01
Payer: MEDICAID

## 2021-04-05 ENCOUNTER — HOSPITAL ENCOUNTER (OUTPATIENT)
Dept: PHYSICAL THERAPY | Age: 61
Discharge: HOME | End: 2021-04-05
Payer: MEDICAID

## 2021-04-05 PROCEDURE — 97014 ELECTRIC STIMULATION THERAPY: CPT

## 2021-04-05 PROCEDURE — 97110 THERAPEUTIC EXERCISES: CPT

## 2021-04-05 PROCEDURE — 97140 MANUAL THERAPY 1/> REGIONS: CPT

## 2021-04-05 NOTE — PROGRESS NOTES
PT DAILY TREATMENT NOTE - [facility]     Patient Name: Rosalba Hartley  Date:2021  : 1960  [x]  Patient  Verified  Payor: Leola Arreola / Plan: 1 [facility] 270 / Product Type: Managed Care Medicaid /    Treatment Area: Pain in right shoulder [M25.511]   Next MD APPT: 4/15/21  In time: 4:13pm Out time:5:30 pm  Total Treatment Time (min):60  Total Timed Codes (min): 60  1:1 Treatment Time ( [address] only) 75    Visit #:                        SUBJECTIVE  Pain Level (0-10 scale) pre treatment: 0/10    Pain Level (0-10 scale) post treatment: 0/10  No pain stiffness   Any medication changes, allergies to medications, adverse drug reactions, diagnosis change, or new procedure performed?: [x] No    [] Yes (see summary sheet for update)  Subjective functional status/changes:   [] No changes reported  Pt reports no R shoulder pain but stiffness and limited AROM.          OBJECTIVE  Modality rationale: decrease inflammation, decrease pain, increase tissue extensibility and increase muscle contraction/control to improve the patients ability to raise shoulder and reach   Min Type Additional Details      15 [x] Estim: []Att   []Unatt    []TENS instruct                  [x]IFC  []Premod   []NMES                     []Other:  []w/US   []w/ice   [x]w/heat  Position:supine   Location:R shoulder        []  Traction: [] Cervical       []Lumbar                       [] Prone          []Supine                       []Intermittent   []Continuous Lbs:  [] before manual  [] after manual  []w/heat    []  Ultrasound: []Continuous   [] Pulsed                       at: []1MHz   []3MHz Location:  W/cm2:    [] Paraffin         Location:   []w/heat    []  Ice     []  Heat  []  Ice massage Position:  Location:    []  Laser  []  Other: Position:  Location:      []  Vasopneumatic Device Pressure:       [] lo [] med [] hi   Temperature:      [x] Skin assessment post-treatment:  [x]intact []redness- no adverse reaction    []redness  adverse reaction:       35 min Therapeutic Exercise:  [x] See flow sheet :   Rationale: increase ROM, increase strength and increase proprioception to improve the patients ability to raise arm pain free  20 min Manual Therapy: [address] mobs w/ PROM and STM R UT/LS, fredi-scapula muscles contract relax technique. Rationale: decrease pain, increase ROM, increase tissue extensibility, decrease trigger points and increase postural awareness to improve the patients ability to improve range of motion to with ADL's    With   [x] TE   [] TA   [] Neuro   [] SC   [] other: Patient Education: [x] Review HEP    [] Progressed/Changed HEP based on:   [] positioning   [] body mechanics   [] transfers   [x] Use of heat/ice    [] other:          Other Objective/Functional Measures:    Reports tightness at [address] [facility]  Continued to work on [address] mobility and soft tissue release. ASSESSMENT/Changes in Function:    Patient required cues to decreased shoulder hiking and trunk compensation with exercises, she presents with increased soft tissue restrictions and limited R shoulder AROM ABD>Flexion>ER. Patient was educated on Frozen shoulder stages, since she stated she is getting frustrated that she's not getting any better. Pt continues to have significant capsular and soft tissue restrictions limiting R shoulder ROM. Pt is making modest improvement in ROM. She has two more visits then f/u with MD. Roberto Carlos Argueta provided as per patient request   Patient will continue to benefit from skilled PT services to modify and progress therapeutic interventions, address functional mobility deficits, address ROM deficits, address strength deficits, analyze and address soft tissue restrictions, analyze and cue movement patterns, analyze and modify body mechanics/ergonomics and assess and modify postural abnormalities to attain remaining goals. GOALS/Progress towards goals:  Short Term Goals:  To be accomplished in 4-6 treatments. 1. Indep with a HEP to assist in rehab progression. [x]? Met []? Not met []? Partially met    2. 10-20 deg improvement in R shoulder flexion and abduction. []? Met []? Not met []? Partially met    3. Max R shoulder pain 5/10. []? Met []? Not met [x]? Partially met  Reports typically max pain 5/10, mostly at night     Long Term Goals: To be accomplished in 8-12 treatments. 1.  Pt will be able to sleep on her R side and/or stomach without R shoulder pain. []? Met []? Not met [x]? Partially met Still sleeping on her L side but is sleeping better now   2. Pt will be able to reach overhead to comb hair without difficulty. []? Met []? Not met [x]? Partially met   Improving  3. Pt will be able to don shirts without difficulty. []? Met []? Not met [x]? Partially met  Improving  4. Pt will be able to place objects on overhead shelf without difficulty. []? Met [x]? Not met []?  Partially met   Still using L arm for tasks     PLAN  [x]  Upgrade activities as tolerated     [x]  Continue plan of care  [x]  Update interventions per flow sheet       []  Discharge due to:_   []  Other:_     Gypsy Espana, PT, DPT 4/5/2021

## 2021-04-08 ENCOUNTER — HOSPITAL ENCOUNTER (OUTPATIENT)
Dept: PHYSICAL THERAPY | Age: 61
Discharge: HOME | End: 2021-04-08
Payer: MEDICAID

## 2021-04-08 PROCEDURE — 97110 THERAPEUTIC EXERCISES: CPT

## 2021-04-08 NOTE — PROGRESS NOTES
[address] [address] [facility] [address] [facility], [address] [place] [address]  Ph: [phone]    Fax: [phone]  Plan of Care  Name: Roosevelt Saab  : 1960   MD: Christie Redd MD     Medical/Treatment Diagnosis: Pain in right shoulder [M25.511]     Problem List/Impairments: decrease ROM, decrease strength and decrease flexibility/ joint mobility    Start of Care: 21 Visits from Start of Care:12   Missed Visits: 0  Certification Period: 21-21   Frequency/Duration: 2 times a week for 8 treatments   Treatment Plan may include any combination of the following: Therapeutic exercise, Physical agent/modality, Manual therapy and Patient education  [x]  Plan of care has been reviewed with PTA. Patient/ Caregiver education and instruction: exercises     Summary of Care/Goals:  SUBJECTIVE  Pain Level (0-10 scale) pre treatment: 0/10    Pain Level (0-10 scale) post treatment: 0/10   Any medication changes, allergies to medications, adverse drug reactions, diagnosis change, or new procedure performed?: [x]? No    []? Yes (see summary sheet for update)  Subjective functional status/changes:   []? No changes reported  Stiffness only, no pain. Pt reports some improvements with combing hair and dressing herself. Pt reports her reaching has also gotten better but is not where it needs to be. Still unable to reach into an overhead cabinet.           OBJECTIVE                                         Other Objective/Functional Measures:    SHOULDER                                         AROM                        PROM                       MMT    R L R L R L   Flexion (180) 72    3-     Extension (60) 42             Abduction (180) 58   82   2+     Adduction (0)               IR (70) L buttock   T12 52   4 4   ER (90) C5  T3 22   4  4        ASSESSMENT/Changes in Function:    Pt continues to have significant capsular restrictions.   Modest improvement in R shoulder ROM and strength. Pt has a f/u with MD on 4/15 and she has reached 12 out of 12 visits. Pt would like to continue exercises as she is hesitant about manipulation at this time. She could benefit from continued PT services to help maximize her R shoulder functional mobility but progress is slow. Will f/u following MD appt. GOALS/Progress towards goals:  Short Term Goals: To be accomplished in 4-6 treatments. 1. Indep with a HEP to assist in rehab progression.               [x]? ? Met []? ? Not met []? ? Partially met    2. 10-20 deg improvement in R shoulder flexion and abduction.               [x]? ? Met []? ? Not met []? ? Partially met    3. Max R shoulder pain 5/10.               [x]? ? Met []? ? Not met []? ? Partially met      Long Term Goals: To be accomplished in 8-12 treatments. 1.  Pt will be able to sleep on her R side and/or stomach without R shoulder pain. [x]?? Met []? ? Not met []? ? Partially met   2. Pt will be able to reach overhead to comb hair without difficulty.               []?? Met []? ? Not met [x]? ? Partially met   Improving - moderate difficulty   3. Pt will be able to don shirts without difficulty.               []?? Met []? ? Not met [x]? ? Partially met  Improving - moderate difficulty   4.  Pt will be able to place objects on overhead shelf without difficulty. []?? Met [x]? ? Not met []? ? Partially met   Still using L arm for tasks    Recommendations: Continue current POC if manipulation is not indicated at this time. If manipulation is indicated with PT following, please provide pt with new referral.  Thank you! Elisa Bennett, PT, DPT 4/8/2021     Retain this original for your records. If you are unable to process this request in 24 hours, please contact our office.   ________________________________________________________________________  NOTE TO PHYSICIAN:  Please complete the following and fax to:   [id number] [facility]:  Fax: [phone]   ____ I have read the above report and request that my patient continue therapy. ____ I have read the above report and request that my patient continue therapy with the following changes/special instructions:    ____ I have read the above report and request that my patient be discharged from therapy.     Physician's Signature:_______________________________________________ Date:_____________Time:____________      Toya Nicholson MD

## 2021-04-08 NOTE — PROGRESS NOTES
PT DAILY TREATMENT NOTE - MCR     Patient Name: Juana Po  Date:2021  : 1960  [x]  Patient  Verified  Payor: Gerhardt Furlough / Plan: [address] [id number] / Product Type: Managed Care Medicaid /    Treatment Area: Pain in right shoulder [M25.511]   Next MD APPT: 4/15/21  In time:1:08pm Out time: 2:53pm  Total Treatment Time (min):45  Total Timed Codes (min): 45  1:1 Treatment Time ( [address] only): 39  Visit #:                        SUBJECTIVE  Pain Level (0-10 scale) pre treatment: 0/10    Pain Level (0-10 scale) post treatment: 0/10   Any medication changes, allergies to medications, adverse drug reactions, diagnosis change, or new procedure performed?: [x] No    [] Yes (see summary sheet for update)  Subjective functional status/changes:   [] No changes reported  Stiffness only, no pain. Pt reports some improvements with combing hair and dressing herself. Pt reports her reaching has also gotten better but is not where it needs to be. Still unable to reach into an overhead cabinet. OBJECTIVE    45 min Therapeutic Exercise:  [x] See flow sheet : reassessed R shoulder ROM and strength. Reviewed HEP with new handouts.      Rationale: increase ROM, increase strength and increase proprioception to improve the patients ability to raise arm pain free    With   [x] TE   [] TA   [] Neuro   [] SC   [] other: Patient Education: [x] Review HEP    [] Progressed/Changed HEP based on:   [] positioning   [] body mechanics   [] transfers   [x] Use of heat/ice    [] other:          Other Objective/Functional Measures:    SHOULDER                                         AROM                        PROM                       MMT    R L R L R L   Flexion (180) 72    3-     Extension (60) 42             Abduction (180) 58   82   2+     Adduction (0)               IR (70) L buttock   T12 52   4 4   ER (90) C5  T3 22   4  4           ASSESSMENT/Changes in Function:    Pt continues to have significant capsular restrictions. Modest improvement in R shoulder ROM and strength. Pt has a f/u with MD on 4/15 and she has reached 12 out of 12 visits. Pt would like to continue exercises as she is hesitant about manipulation at this time. She could benefit from continued PT services  Patient will continue to be to help maximize her R shoulder functional mobility. Will f/u following MD appt. Would benefit from skilled PT services to modify and progress therapeutic interventions, address functional mobility deficits, address ROM deficits, address strength deficits, analyze and address soft tissue restrictions, analyze and cue movement patterns, analyze and modify body mechanics/ergonomics and assess and modify postural abnormalities to attain remaining goals. GOALS/Progress towards goals:  Short Term Goals: To be accomplished in 4-6 treatments. 1. Indep with a HEP to assist in rehab progression. [x]? Met []? Not met []? Partially met    2. 10-20 deg improvement in R shoulder flexion and abduction. [x]? Met []? Not met []? Partially met    3. Max R shoulder pain 5/10. [x]? Met []? Not met []? Partially met     Long Term Goals: To be accomplished in 8-12 treatments. 1.  Pt will be able to sleep on her R side and/or stomach without R shoulder pain. [x]? Met []? Not met []? Partially met   2. Pt will be able to reach overhead to comb hair without difficulty. []? Met []? Not met [x]? Partially met   Improving - moderate difficulty   3. Pt will be able to don shirts without difficulty. []? Met []? Not met [x]? Partially met  Improving - moderate difficulty   4. Pt will be able to place objects on overhead shelf without difficulty. []? Met [x]? Not met []?  Partially met   Still using L arm for tasks     PLAN  [x]  Upgrade activities as tolerated     [x]  Continue plan of care  [x]  Update interventions per flow sheet       []  Discharge due to:_   [] Other:_continue extensive posterior capsule stretching      Elisa Romero, PT, DPT 4/8/2021

## 2021-07-27 NOTE — PROGRESS NOTES
[address] [address] [facility] [address] [facility], [address] [place] [address]  Ph: [phone]  Fax: [phone]    Medicaid Discharge Summary 2-15    Patient name: Trisha Loera  : 1960  Provider#: [id number]  Referral source: Christina Spence MD      Medical/Treatment Diagnosis: Pain in right shoulder [M25.511]     Prior Hospitalization: see medical history     Comorbidities: See Plan of Care  Prior Level of Function: See Plan of Care  Medications: Verified on Patient Summary List  Start of Care: 21   Onset Date:2020    Visits from Start of Care: 12  Missed Visits: 0  Certification Period : 21 to 21  Assessment/Summary of care/GOALS:   Pt has not returned since her last progress report on 21. Below was her status at that time. We will be closing out her chart at this time. If she wishes to return at a later date she will need a new referral.  Thank you! SUBJECTIVE  Pain Level (0-10 scale) pre treatment: 0/10    Pain Level (0-10 scale) post treatment: 0/10   Any medication changes, allergies to medications, adverse drug reactions, diagnosis change, or new procedure performed?: [x]? ? No    []? ? Yes (see summary sheet for update)  Subjective functional status/changes:   []? ? No changes reported  Stiffness only, no pain.  Pt reports some improvements with combing hair and dressing herself.  Pt reports her reaching has also gotten better but is not where it needs to be.  Still unable to reach into an overhead cabinet.           OBJECTIVE                                         Other Objective/Functional Measures:    SHOULDER                                         AROM                        PROM                       MMT    R L R L R L   Flexion (180) 72    3-     Extension (60) 42             Abduction (180) 58   82   2+     Adduction (0)               IR (70) L buttock   T12 52   4 4   ER (90) C5  T3 22   4  4         ASSESSMENT/Changes in Function:    Pt continues to have significant capsular restrictions.  Modest improvement in R shoulder ROM and strength.  Pt has a f/u with MD on 4/15 and she has reached 12 out of 12 visits.  Pt would like to continue exercises as she is hesitant about manipulation at this time. Jc Howell could benefit from continued PT services to help maximize her R shoulder functional mobility but progress is slow. Will f/u following MD appt.          GOALS/Progress towards goals:  Short Term Goals: To be accomplished in 4-6 treatments. 1. Indep with a HEP to assist in rehab progression.               [x]? ?? Met []? ?? Not met []? ?? Partially met    2. 10-20 deg improvement in R shoulder flexion and abduction.               [x]? ?? Met []? ?? Not met []? ?? Partially met    3. Max R shoulder pain 5/10.               [x]? ?? Met []? ?? Not met []? ?? Partially met      Long Term Goals: To be accomplished in 8-12 treatments. 1.  Pt will be able to sleep on her R side and/or stomach without R shoulder pain.                [x]? ?? Met []? ?? Not met []? ?? Partially met   2. Pt will be able to reach overhead to comb hair without difficulty.              []? ?? Met []? ?? Not met [x]??? Partially met   Improving - moderate difficulty   3. Pt will be able to don shirts without difficulty.              []? ?? Met []? ?? Not met [x]??? Partially met  Improving - moderate difficulty   4.  Pt will be able to place objects on overhead shelf without difficulty.              []? ?? Met [x]? ?? Not met []? ?? Partially met   Still using L arm for tasks      RECOMMENDATIONS:  [x]Discontinue therapy: []Patient has reached or is progressing toward set goals and is independent with HEP     [x]Patient is non-compliant or has abdicated     []Due to lack of appreciable progress towards set goals     []Carson Kendall, PT, DPT 7/27/2021   Retain this original for your records.   If you are unable to process this request in 24 hours, please contact our office.   ________________________________________________________________________  NOTE TO PHYSICIAN:  Please complete the following and FAX to: [address]:  Fax: [phone]   ____ I have read the above report and request that my patient be discharged from therapy.     Physician's Signature:_____________________________________________________ Date:_____________Time:_____________     Nancy Burks MD

## 2021-09-21 ENCOUNTER — HOSPITAL ENCOUNTER (OUTPATIENT)
Dept: PHYSICAL THERAPY | Age: 61
Discharge: HOME | End: 2021-09-21
Payer: MEDICAID

## 2021-09-21 PROCEDURE — 97161 PT EVAL LOW COMPLEX 20 MIN: CPT

## 2021-09-21 PROCEDURE — 97110 THERAPEUTIC EXERCISES: CPT

## 2021-09-21 PROCEDURE — 97140 MANUAL THERAPY 1/> REGIONS: CPT

## 2021-09-21 NOTE — PROGRESS NOTES
[address] [address] [place] [facility] [place] [address] [facility], [address] [place] [address]  Ph: [phone]    Fax: [phone]    Initial Evaluation/Plan of Care/Statement of Necessity for Physical Therapy Services     Patient name: Edna Pimentel   : 1960  [x]  Patient  Verified Provider#: [id number]    Start of Care/Date:2021         Referral source: Stephany Dinero MD Return visit to MD: as needed     Medical/Treatment Diagnosis: Right shoulder pain [M25.511]  Adhesive capsulitis of right shoulder [M75.01]    Payor: Jose Tobin / Plan: [facility] / Product Type: Managed Care Medicaid /       Prior Hospitalization: see medical history     Comorbidities: DM, HTN, arthritis  Prior Level of Function: indepedent  Medications: Verified on Patient Summary List          Patient / Family readiness to learn indicated by: asking questions and trying to perform skills  Persons(s) to be included in education: patient (P)  Barriers to Learning/Limitations: None  Patient Self Reported Health Status: fair  Rehabilitation Potential: fair  Previous Treatment/Compliance: outpatient PT (feb - 2021) with good compliance, fair compliance to independent HEP post-discharge  PMHx/Surgical Hx: frozen shoulder 20 years ago  Work Hx: retired but would like to get a part-time job  Barriers to progress: none  Motivation: good  Substance use: none reported  Cognition: A & O x 4   Onset Date: 2020    Visit #:     SUBJECTIVE  Pt reports gradual onset of shoulder pain 2020. Diagnosed with adhesive capsulitis. She was in the clinic for PT from February to 2021 - had some benefit but was not at or near 100% function at discharge. She states that her arm is stiff but is not painful. Her MD told her she could restart PT vs getting an arthroscopic shoulder surgery. Pt would like to avoid surgery if possible.  Pt had been compliant with HEP including an UBE, pulleys, at home but has not been using them as much lately. PAIN  Area of pain: R shoulder  Pain Level (0-10 scale): 0-severe pain (9/10)  Things that worsen pain:  Sudden movements away from the body  Things that ease pain: heat every once in a while, rest - states if she has pain it quickly resolves    OBJECTIVE:   Physical Findings   Ortho:   Posture:  Protracted shoulders  Palpation: tight and tender over R rotator cuff musculature    Specific joints: *normal values in ()    SHOULDER                                         AROM   PROM            MMT   R L R L R L   Flexion (180) 70 160 123  3- 5   Extension (60) 35 40       Abduction (180) 58 136 90  3- 5   Adduction (0)         IR (70) occiput T3 35  5 5   ER (90) PSIS T12 20  4+ 5   Additional comments: strength limited in available range on R side, cannot hold resistance into elevation. Good strength for IR/ER when assessed with arm at side. ER/IR PROM with arm at almost 90 deg ABD     SPINE:   CERVICAL SPINE    AROM       PROM  Flexion 60/WFL    Extension 40        AROM        PROM   R L R L   Lat Flexion 25 35     Rotation 55 54     Additional Comments: states she has arthritis, no pain      Strength: 36 lbs R/L  Special Tests: n/a    Ampa Score:  49.56%    ASSESSMENT/Changes in Function:   Pt is a 59year old female presenting to outpatient PT services with diagnosis of R adhesive capsulitis. Pt has had symptoms since November 2020 and has previously completed one course of physical therapy last spring. She currently presents with limited R shoulder active and passive ROM and increased joint/capsular stiffness but without increased pain. When compared with discharge status after last course of treatment, pt has no change in AROM and slight improvement in passive range. Pt will benefit from skilled PT services to address impairments and allow maximum return of R shoulder strength and ROM.     Problem List/Impairments: decrease ROM, decrease strength, decrease ADL/ functional abilitiies and decrease flexibility/ joint mobility  Treatment Plan may include any combination of the following: Therapeutic exercise, Neuromuscular re-education, Physical agent/modality, Manual therapy, Patient education and Self Care training  Patient/ Caregiver education and instruction: exercises  Frequency / Duration: Patient to be seen 2 times per week for 16 treatments. Certification Period:  9/21/21 to 12/20/21    Patient Goal(s): increase motion    Short Term Goals: To be accomplished in 6 treatments. 1. Patient will be compliant with a progressive HEP. 2. Patient will demonstrate 10 degree improvement in flexion and abduction R shoulder ROM. Long Term Goals: To be accomplished in 16 treatments. 1. Patient will demo 130 degrees R shoulder elevation without pain. 2. Patient will demo functional RUE internal rotation AROM to belt line/L5 to assist in dressing. 3. Patient will demo ability to use R hand to do her hair without difficulty. Today's Treatment:  In time:1000am   Out time:1045am Total Treatment Time (min): 45  Pain Level (0-10 scale) pre treatment: 0/10 Pain Level (0-10 scale) post treatment: 0/10        10 min Therapeutic Exercise:  [x] See flow sheet : Review HEP   Rationale: increase ROM and increase strength to improve the patients ability to raise the R arm  8 min Manual Therapy: PROM and joint mobilization    Rationale: increase ROM, increase tissue extensibility and decrease trigger points to improve the patients ability to raise the R arm   With   [x] TE   [] TA   [] Neuro   [] SC   [] other: Patient Education: [x] Review HEP    [] Progressed/Changed HEP based on:   [] positioning   [] body mechanics   [] transfers   [] heat/ice application    [] other:      [x]  Plan of care has been reviewed with PTA. The Plan of Care is based on information from the initial evaluation.     Geovanni Díaz, PT, DPT 9/21/2021 ________________________________________________________________________    I certify that the above Therapy Services are being furnished while the patient is under my care. I agree with the treatment plan and certify that this therapy is necessary.     Physician's Signature:_______________________________________________  Date:______________Time: ___________     Jaqui Palma MD

## 2021-09-24 ENCOUNTER — HOSPITAL ENCOUNTER (OUTPATIENT)
Dept: PHYSICAL THERAPY | Age: 61
Discharge: HOME | End: 2021-09-24
Payer: MEDICAID

## 2021-09-24 PROCEDURE — 97140 MANUAL THERAPY 1/> REGIONS: CPT

## 2021-09-24 PROCEDURE — 97110 THERAPEUTIC EXERCISES: CPT

## 2021-09-24 NOTE — PROGRESS NOTES
PT DAILY TREATMENT NOTE  NON [facility]     Patient Name: Anahi Da Silva  Date:2021  : 1960  [x]  Patient  Verified  Payor: Lubna Pyle / Plan: 1 [facility] 270 / Product Type: Managed Care Medicaid /    Treatment Area: Right shoulder pain [M25.511]  Adhesive capsulitis of right shoulder [M75.01]       Next MD APPT: as needed  In time:0804am  Out time:0858am  Total Treatment Time (min): 52  Visit #:     SUBJECTIVE  Pain Level (0-10 scale) pre treatment: 0/10      Pain Level (0-10 scale) post treatment: 0/10  Any medication changes, allergies to medications, adverse drug reactions, diagnosis change, or new procedure performed?:   [] No    [] Yes (see summary sheet for update)  Subjective functional status/changes:   [] No changes reported  No soreness after evaluation. She feels fine today. OBJECTIVE    40 min Therapeutic Exercise:  [x] See flow sheet :   Rationale: increase ROM and increase strength to improve the patients ability to raise the R arm  12 min Manual Therapy: joint mobilization and PROM R shoulder    Rationale: increase ROM to improve the patients ability to raise the R arm    With   [] TE   [] TA   [] Neuro   [] SC   [] other: Patient Education: [] Review HEP    [] Progressed/Changed HEP based on:   [] positioning   [] body mechanics   [] transfers   [] Use of heat/ice     [] other:         Other Objective/Functional Measures: Initiated POC with emphasis on ROM and stretching  R shoulder flexion PROM - 125 degrees     ASSESSMENT/Changes in Function:    Pt states she is better able to lift her arm to put on her deoderant. No significant change in PROM today. Pt tolerated exercises well, with some soreness with end range of motion stretching.  Patient will continue to benefit from skilled PT services to modify and progress therapeutic interventions, address ROM deficits, analyze and address soft tissue restrictions and analyze and cue movement patterns to attain remaining goals. GOALS/Progress towards goals:  Patient Goal(s): increase motion    Short Term Goals: To be accomplished in 6 treatments. 1. Patient will be compliant with a progressive HEP. [x] Met [] Not met [] Partially met  9/24/21  2. Patient will demonstrate 10 degree improvement in flexion and abduction R shoulder ROM. [] Met [] Not met [] Partially met   Long Term Goals: To be accomplished in 16 treatments. 1. Patient will demo 130 degrees R shoulder elevation without pain. [] Met [] Not met [] Partially met   2. Patient will demo functional RUE internal rotation AROM to belt line/L5 to assist in dressing. [] Met [] Not met [] Partially met   3. Patient will demo ability to use R hand to do her hair without difficulty.    [] Met [] Not met [] Partially met     PLAN  []  Upgrade activities as tolerated     [x]  Continue plan of care  [x]  Update interventions per flow sheet       []  Discharge due to:_  []  Other:_      Peggy Knapp, PT, DPT 9/24/2021

## 2021-09-29 ENCOUNTER — HOSPITAL ENCOUNTER (OUTPATIENT)
Dept: PHYSICAL THERAPY | Age: 61
Discharge: HOME | End: 2021-09-29
Payer: MEDICAID

## 2021-09-29 PROCEDURE — 97140 MANUAL THERAPY 1/> REGIONS: CPT

## 2021-09-29 PROCEDURE — 97110 THERAPEUTIC EXERCISES: CPT

## 2021-09-29 NOTE — PROGRESS NOTES
PT DAILY TREATMENT NOTE  NON [facility]     Patient Name: Estevan Sorensen  Date:2021  : 1960  [x]  Patient  Verified  Payor: Richard Tam / Plan: 1 [facility] 270 / Product Type: Managed Care Medicaid /    Treatment Area: Right shoulder pain [M25.511]  Adhesive capsulitis of right shoulder [M75.01]       Next MD APPT: as needed  In time:10:50 am  Out time:11:40 am  Total Treatment Time (min): 50 min  Visit #:3/16    SUBJECTIVE  Pain Level (0-10 scale) pre treatment: 0/10 (stiffness)  Pain Level (0-10 scale) post treatment:0/10  Any medication changes, allergies to medications, adverse drug reactions, diagnosis change, or new procedure performed?:   [] No    [] Yes (see summary sheet for update)  Subjective functional status/changes:   [] No changes reported  Patient reports no pain only stiffness today. Patient also complains of neck pain. \" I don't know why I can't relax and stay so stiff. \" Patient stated she exercises everyday sitting in her chair. OBJECTIVE    20 min Therapeutic Exercise:  [x] See flow sheet :   Rationale: increase ROM and increase strength to improve the patients ability to raise the R arm  30 min Manual Therapy: joint mobilization and PROM R shoulder    Rationale: increase ROM to improve the patients ability to raise the R arm    With   [x] TE   [] TA   [] Neuro   [] SC   [] other: Patient Education: [] Review HEP    [] Progressed/Changed HEP based on:   [] positioning   [] body mechanics   [] transfers   [] Use of heat/ice     [x] other: Used [employer] on Deltoid and UT        Other Objective/Functional Measures:   Flex = 90 deg  Abd = 90 deg    ASSESSMENT/Changes in Function:   Patient with significant tightness to UT/deltoid and scapula area. Patient did well following MT but has trigger points and tightness throughout UT/deltoid area. Patient noted relief following MT with rock tool. Improvement noted with PROM and no increase in pain.  Patient does grimace during PROM but reports no pain. Patient will continue to benefit from skilled PT services to modify and progress therapeutic interventions, address ROM deficits, analyze and address soft tissue restrictions and analyze and cue movement patterns to attain remaining goals. GOALS/Progress towards goals:  Patient Goal(s): increase motion    Short Term Goals: To be accomplished in 6 treatments. 1. Patient will be compliant with a progressive HEP. [x] Met [] Not met [] Partially met  9/24/21  2. Patient will demonstrate 10 degree improvement in flexion and abduction R shoulder ROM. [] Met [] Not met [] Partially met   Long Term Goals: To be accomplished in 16 treatments. 1. Patient will demo 130 degrees R shoulder elevation without pain. [] Met [] Not met [] Partially met   2. Patient will demo functional RUE internal rotation AROM to belt line/L5 to assist in dressing. [] Met [] Not met [] Partially met   3. Patient will demo ability to use R hand to do her hair without difficulty.    [] Met [] Not met [] Partially met     PLAN  []  Upgrade activities as tolerated     [x]  Continue plan of care  [x]  Update interventions per flow sheet       []  Discharge due to:_  []  Other:_      Freida Andre, PTA 9/29/2021

## 2021-10-01 ENCOUNTER — HOSPITAL ENCOUNTER (OUTPATIENT)
Dept: PHYSICAL THERAPY | Age: 61
Discharge: HOME | End: 2021-10-01
Payer: MEDICAID

## 2021-10-01 ENCOUNTER — HOSPITAL ENCOUNTER (OUTPATIENT)
Dept: MAMMOGRAPHY | Age: 61
Discharge: HOME | End: 2021-10-01
Attending: SURGERY
Payer: MEDICAID

## 2021-10-01 DIAGNOSIS — Z12.31 SCREENING MAMMOGRAM FOR HIGH-RISK PATIENT: ICD-10-CM

## 2021-10-01 PROCEDURE — 77067 SCR MAMMO BI INCL CAD: CPT

## 2021-10-01 PROCEDURE — 97140 MANUAL THERAPY 1/> REGIONS: CPT

## 2021-10-01 PROCEDURE — 97110 THERAPEUTIC EXERCISES: CPT

## 2021-10-01 NOTE — PROGRESS NOTES
PT DAILY TREATMENT NOTE  NON [facility]     Patient Name: Cheryl Wise  Date:10/1/2021  : 1960  [x]  Patient  Verified  Payor: Nichola Kawasaki / Plan: [address] [address] / Product Type: Managed Care Medicaid /    Treatment Area: Right shoulder pain [M25.511]  Adhesive capsulitis of right shoulder [M75.01]       Next MD APPT: as needed  In time:11:30 am  Out time:12:20 am  Total Treatment Time (min): 45 min  Visit #:    SUBJECTIVE  Pain Level (0-10 scale) pre treatment: 0/10 (stiffness)  Pain Level (0-10 scale) post treatment:0/10  Any medication changes, allergies to medications, adverse drug reactions, diagnosis change, or new procedure performed?:   [] No    [] Yes (see summary sheet for update)  Subjective functional status/changes:   [] No changes reported  Patient she has noticed an improvement. \"I can tell by how high I can reach my hair. \" She stated she realized she had not been using her R arm as much, but she will start using it more. OBJECTIVE    25 min Therapeutic Exercise:  [x] See flow sheet :   Rationale: increase ROM and increase strength to improve the patients ability to raise the R arm  20 min Manual Therapy: joint mobilization and PROM R shoulder    Rationale: increase ROM to improve the patients ability to raise the R arm    With   [x] TE   [] TA   [] Neuro   [] SC   [] other: Patient Education: [] Review HEP    [] Progressed/Changed HEP based on:   [] positioning   [] body mechanics   [] transfers   [] Use of heat/ice     [x] other: Used [employer] on Deltoid and UT        Other Objective/Functional Measures:   Flex = 90 deg  Abd = 90 deg    ASSESSMENT/Changes in Function:   Patient still with significant tightness at end ROM. Reviewed with patient table stretches and wand to perform at home for HEP. Patient was able to maintain ROM from previous visit. Patient is also going to start to use her R UE more at home.  Patient will continue to benefit from skilled PT services to modify and progress therapeutic interventions, address ROM deficits, analyze and address soft tissue restrictions and analyze and cue movement patterns to attain remaining goals. GOALS/Progress towards goals:  Patient Goal(s): increase motion    Short Term Goals: To be accomplished in 6 treatments. 1. Patient will be compliant with a progressive HEP. [x] Met [] Not met [] Partially met  9/24/21  2. Patient will demonstrate 10 degree improvement in flexion and abduction R shoulder ROM. [] Met [] Not met [] Partially met   Long Term Goals: To be accomplished in 16 treatments. 1. Patient will demo 130 degrees R shoulder elevation without pain. [] Met [] Not met [] Partially met   2. Patient will demo functional RUE internal rotation AROM to belt line/L5 to assist in dressing. [] Met [] Not met [] Partially met   3. Patient will demo ability to use R hand to do her hair without difficulty.    [] Met [] Not met [] Partially met     PLAN  []  Upgrade activities as tolerated     [x]  Continue plan of care  [x]  Update interventions per flow sheet       []  Discharge due to:_  []  Other:_      Freida Andre, PTA 10/1/2021

## 2021-10-05 ENCOUNTER — HOSPITAL ENCOUNTER (OUTPATIENT)
Dept: PHYSICAL THERAPY | Age: 61
Discharge: HOME | End: 2021-10-05
Payer: MEDICAID

## 2021-10-05 PROCEDURE — 97140 MANUAL THERAPY 1/> REGIONS: CPT

## 2021-10-05 PROCEDURE — 97110 THERAPEUTIC EXERCISES: CPT

## 2021-10-05 NOTE — PROGRESS NOTES
PT DAILY TREATMENT NOTE  NON [facility]     Patient Name: Hallie Forte  Date:10/5/2021  : 1960  [x]  Patient  Verified  Payor: [address] [place],9D / Plan: 1 [place] [id number] / Product Type: Managed Care Medicaid /    Treatment Area: Right shoulder pain [M25.511]  Adhesive capsulitis of right shoulder [M75.01]       Next MD APPT: as needed  In time: 0110pm  Out time: 0200pm  Total Treatment Time (min): 50  Visit #:     SUBJECTIVE  Pain Level (0-10 scale) pre treatment: 0/10  Pain Level (0-10 scale) post treatment: 0/10  Any medication changes, allergies to medications, adverse drug reactions, diagnosis change, or new procedure performed?:   [] No    [] Yes (see summary sheet for update)  Subjective functional status/changes:   [] No changes reported  Pt continues without pain but significant tightness. \"it is loosening up\"      OBJECTIVE    25 min Therapeutic Exercise:  [x] See flow sheet :   Rationale: increase ROM and increase strength to improve the patients ability to raise the R arm  25 min Manual Therapy: joint mobilization and PROM R shoulder    Rationale: increase ROM to improve the patients ability to raise the R arm    With   [x] TE   [] TA   [] Neuro   [] SC   [] other: Patient Education: [] Review HEP    [] Progressed/Changed HEP based on:   [] positioning   [] body mechanics   [] transfers   [] Use of heat/ice     [x] other: Used [employer] on Deltoid and UT        Other Objective/Functional Measures:   Flex = 90 deg  Abd = 90 deg   PROM supine flexion  - 120    ASSESSMENT/Changes in Function:   Pt has muscle tightness through the R upper quarter and reports tenderness to palpation R deltoids, R teres minor, major, subscapularis, medial scapular border. Mobility with posterior joint mobs improving but still hypomobile at end range. No changes in PROM. Moderate increase in AROM elevation and pt reports of functional IR/ER since evaluation.  Patient will continue to benefit from skilled PT services to modify and progress therapeutic interventions, address ROM deficits, analyze and address soft tissue restrictions and analyze and cue movement patterns to attain remaining goals. GOALS/Progress towards goals:  Patient Goal(s): increase motion    Short Term Goals: To be accomplished in 6 treatments. 1. Patient will be compliant with a progressive HEP. [x] Met [] Not met [] Partially met  9/24/21  2. Patient will demonstrate 10 degree improvement in flexion and abduction R shoulder ROM. [] Met [] Not met [] Partially met   Long Term Goals: To be accomplished in 16 treatments. 1. Patient will demo 130 degrees R shoulder elevation without pain. [] Met [] Not met [] Partially met   2. Patient will demo functional RUE internal rotation AROM to belt line/L5 to assist in dressing. [] Met [] Not met [] Partially met   3. Patient will demo ability to use R hand to do her hair without difficulty.    [] Met [] Not met [] Partially met     PLAN  []  Upgrade activities as tolerated     [x]  Continue plan of care  [x]  Update interventions per flow sheet       []  Discharge due to:_  []  Other:_      Karthikeyan Moncada, PT, DPT 10/5/2021

## 2021-10-08 ENCOUNTER — HOSPITAL ENCOUNTER (OUTPATIENT)
Dept: PHYSICAL THERAPY | Age: 61
Discharge: HOME | End: 2021-10-08
Payer: MEDICAID

## 2021-10-08 PROCEDURE — 97140 MANUAL THERAPY 1/> REGIONS: CPT

## 2021-10-08 PROCEDURE — 97110 THERAPEUTIC EXERCISES: CPT

## 2021-10-08 NOTE — PROGRESS NOTES
PT DAILY TREATMENT NOTE  NON [facility]     Patient Name: Estevan Sorensen  Date:10/8/2021  : 1960  [x]  Patient  Verified  Payor: [address] [place],9D / Plan: 1 [facility] / Product Type: Managed Care Medicaid /    Treatment Area: Right shoulder pain [M25.511]  Adhesive capsulitis of right shoulder [M75.01]       Next MD APPT: as needed  In time: 01:45 pm  Out time: 02:35pm  Total Treatment Time (min): 50 min25  Visit #:     SUBJECTIVE  Pain Level (0-10 scale) pre treatment: 0/10  Pain Level (0-10 scale) post treatment: 0/10  Any medication changes, allergies to medications, adverse drug reactions, diagnosis change, or new procedure performed?:   [] No    [] Yes (see summary sheet for update)  Subjective functional status/changes:   [] No changes reported  Pt. Reports no pain at this time , just stiffness. \"It's getting a little better at times. I can tell when I fix my hair. \"  OBJECTIVE    30 min Therapeutic Exercise:  [x] See flow sheet :   Rationale: increase ROM and increase strength to improve the patients ability to raise the R arm  20 min Manual Therapy: joint mobilization and PROM R shoulder    Rationale: increase ROM to improve the patients ability to raise the R arm    With   [x] TE   [] TA   [] Neuro   [] SC   [] other: Patient Education: [] Review HEP    [] Progressed/Changed HEP based on:   [] positioning   [] body mechanics   [] transfers   [] Use of heat/ice     [x] other: Used [employer] on Deltoid and UT        Other Objective/Functional Measures:   Flex = 90 deg  Abd = 90 deg   PROM supine flexion  - 120    ASSESSMENT/Changes in Function:   Pt still with muscle tightness through the R upper quarter. No changes in PROM in ABD/Flexion. Patient compensating with AROM exercises, hiking shoulder or leaning . Patient does report an improvement when she does her hair.  Patient will continue to benefit from skilled PT services to modify and progress therapeutic interventions, address ROM deficits, analyze and address soft tissue restrictions and analyze and cue movement patterns to attain remaining goals. GOALS/Progress towards goals:  Patient Goal(s): increase motion    Short Term Goals: To be accomplished in 6 treatments. 1. Patient will be compliant with a progressive HEP. [x] Met [] Not met [] Partially met  9/24/21  2. Patient will demonstrate 10 degree improvement in flexion and abduction R shoulder ROM. [] Met [] Not met [] Partially met   Long Term Goals: To be accomplished in 16 treatments. 1. Patient will demo 130 degrees R shoulder elevation without pain. [] Met [] Not met [] Partially met   2. Patient will demo functional RUE internal rotation AROM to belt line/L5 to assist in dressing. [] Met [] Not met [] Partially met   3. Patient will demo ability to use R hand to do her hair without difficulty.    [] Met [] Not met [] Partially met     PLAN  []  Upgrade activities as tolerated     [x]  Continue plan of care  [x]  Update interventions per flow sheet       []  Discharge due to:_  []  Other:_      Freida Andre, LUISANA 10/8/2021

## 2021-10-12 ENCOUNTER — HOSPITAL ENCOUNTER (OUTPATIENT)
Dept: PHYSICAL THERAPY | Age: 61
Discharge: HOME | End: 2021-10-12
Payer: MEDICAID

## 2021-10-12 PROCEDURE — 97110 THERAPEUTIC EXERCISES: CPT

## 2021-10-12 PROCEDURE — 97140 MANUAL THERAPY 1/> REGIONS: CPT

## 2021-10-12 NOTE — PROGRESS NOTES
PT DAILY TREATMENT NOTE  NON [facility]     Patient Name: Broderick Alvarez  Date:10/12/2021  : 1960  [x]  Patient  Verified  Payor: [address] [place],9D / Plan: 1 [place] [id number] / Product Type: Managed Care Medicaid /    Treatment Area: Right shoulder pain [M25.511]  Adhesive capsulitis of right shoulder [M75.01]       Next MD APPT: as needed  In time: 1050am  Out time: 1132am  Total Treatment Time (min): 42  Visit #:     SUBJECTIVE  Pain Level (0-10 scale) pre treatment: 0/10  Pain Level (0-10 scale) post treatment: 0/10  Any medication changes, allergies to medications, adverse drug reactions, diagnosis change, or new procedure performed?:   [] No    [] Yes (see summary sheet for update)  Subjective functional status/changes:   [] No changes reported  Continues without pain symptoms. OBJECTIVE    32 min Therapeutic Exercise:  [x] See flow sheet :   Rationale: increase ROM and increase strength to improve the patients ability to raise the R arm  10 min Manual Therapy: joint mobilization and PROM R shoulder    Rationale: increase ROM to improve the patients ability to raise the R arm    With   [x] TE   [] TA   [] Neuro   [] SC   [] other: Patient Education: [] Review HEP    [] Progressed/Changed HEP based on:   [] positioning   [] body mechanics   [] transfers   [] Use of heat/ice     [] other:         Other Objective/Functional Measures:   Flex = 75 deg  Abd = 75 deg   PROM supine flexion  - 124  ABD - 92 deg    ASSESSMENT/Changes in Function:   No significant changes in ROM since beginning treatment. Slight increase in AROM especially into abduction and pt continues to report it is easier to do her hair moving the UE into functional ER. No change in performance on finger ladder.  Patient will continue to benefit from skilled PT services to modify and progress therapeutic interventions, address ROM deficits, analyze and address soft tissue restrictions and analyze and cue movement patterns to attain remaining goals. GOALS/Progress towards goals:  Patient Goal(s): increase motion    Short Term Goals: To be accomplished in 6 treatments. 1. Patient will be compliant with a progressive HEP. [x] Met [] Not met [] Partially met  9/24/21  2. Patient will demonstrate 10 degree improvement in flexion and abduction R shoulder ROM. [] Met [] Not met [x] Partially met  10/12/21 - improvement >10 deg into abduction but not flexion  Long Term Goals: To be accomplished in 16 treatments. 1. Patient will demo 130 degrees R shoulder elevation without pain. [] Met [x] Not met [] Partially met  10/12/21 75 degrees  2. Patient will demo functional RUE internal rotation AROM to belt line/L5 to assist in dressing. [] Met [] Not met [] Partially met   3. Patient will demo ability to use R hand to do her hair without difficulty.    [] Met [] Not met [] Partially met     PLAN  []  Upgrade activities as tolerated     [x]  Continue plan of care  [x]  Update interventions per flow sheet       []  Discharge due to:_  []  Other:_      Unique Reyes PT, DPT 10/12/2021

## 2021-10-15 ENCOUNTER — HOSPITAL ENCOUNTER (OUTPATIENT)
Dept: PHYSICAL THERAPY | Age: 61
Discharge: HOME | End: 2021-10-15
Payer: MEDICAID

## 2021-10-15 PROCEDURE — 97140 MANUAL THERAPY 1/> REGIONS: CPT

## 2021-10-15 PROCEDURE — 97110 THERAPEUTIC EXERCISES: CPT

## 2021-10-15 NOTE — PROGRESS NOTES
PT DAILY TREATMENT NOTE  NON [facility]     Patient Name: Yumi Cummins  Date:10/15/2021  : 1960  [x]  Patient  Verified  Payor: Suzanne Jayden / Plan: 1 [facility] 270 / Product Type: Managed Care Medicaid /    Treatment Area: Right shoulder pain [M25.511]  Adhesive capsulitis of right shoulder [M75.01]       Next MD APPT: as needed  In time: 1045am  Out time: 1133am  Total Treatment Time (min): 48  Visit #:     SUBJECTIVE  Pain Level (0-10 scale) pre treatment: 0/10  Pain Level (0-10 scale) post treatment: 0/10  Any medication changes, allergies to medications, adverse drug reactions, diagnosis change, or new procedure performed?:   [] No    [] Yes (see summary sheet for update)  Subjective functional status/changes:   [] No changes reported  Pt states she did have some mild pain yesterday but states she started working more on exercising at home in the bed. OBJECTIVE    33 min Therapeutic Exercise:  [x] See flow sheet :   Rationale: increase ROM and increase strength to improve the patients ability to raise the R arm  15 min Manual Therapy: joint mobilization and PROM R shoulder , rock tool R UT   Rationale: increase ROM to improve the patients ability to raise the R arm    With   [x] TE   [] TA   [] Neuro   [] SC   [] other: Patient Education: [] Review HEP    [] Progressed/Changed HEP based on:   [] positioning   [] body mechanics   [] transfers   [] Use of heat/ice     [] other:         Other Objective/Functional Measures:     PROM supine flexion  - 128  ABD - 95 deg    ASSESSMENT/Changes in Function:   Small improvements in ROM and finger ladder today. Added additional external rotation stretches and rock tool due to upper trap tightness. Pt tolerated the session well without increase in pain just mild soreness post-treatment. Emphasized trying to do HEP at home to make the most gains.  Patient will continue to benefit from skilled PT services to modify and progress therapeutic interventions, address ROM deficits, analyze and address soft tissue restrictions and analyze and cue movement patterns to attain remaining goals. GOALS/Progress towards goals:  Patient Goal(s): increase motion    Short Term Goals: To be accomplished in 6 treatments. 1. Patient will be compliant with a progressive HEP. [x] Met [] Not met [] Partially met  9/24/21  2. Patient will demonstrate 10 degree improvement in flexion and abduction R shoulder ROM. [] Met [] Not met [x] Partially met  10/12/21 - improvement >10 deg into abduction but not flexion  Long Term Goals: To be accomplished in 16 treatments. 1. Patient will demo 130 degrees R shoulder elevation without pain. [] Met [x] Not met [] Partially met  10/12/21 75 degrees  2. Patient will demo functional RUE internal rotation AROM to belt line/L5 to assist in dressing. [] Met [] Not met [] Partially met   3. Patient will demo ability to use R hand to do her hair without difficulty.    [] Met [] Not met [] Partially met     PLAN  []  Upgrade activities as tolerated     [x]  Continue plan of care  [x]  Update interventions per flow sheet       []  Discharge due to:_  []  Other:_      Amanda Flores PT, DPT 10/15/2021

## 2021-10-19 ENCOUNTER — HOSPITAL ENCOUNTER (OUTPATIENT)
Dept: PHYSICAL THERAPY | Age: 61
Discharge: HOME | End: 2021-10-19
Payer: MEDICAID

## 2021-10-19 PROCEDURE — 97140 MANUAL THERAPY 1/> REGIONS: CPT

## 2021-10-19 PROCEDURE — 97110 THERAPEUTIC EXERCISES: CPT

## 2021-10-19 NOTE — PROGRESS NOTES
PT DAILY TREATMENT NOTE  NON [facility]     Patient Name: Michael November  Date:10/19/2021  : 1960  [x]  Patient  Verified  Payor: [employer] / Plan: 1 [facility] 270 / Product Type: Managed Care Medicaid /    Treatment Area: Right shoulder pain [M25.511]  Adhesive capsulitis of right shoulder [M75.01]       Next MD APPT: as needed  In time: 1035am  Out time: 1130am  Total Treatment Time (min): 55  Visit #:     SUBJECTIVE  Pain Level (0-10 scale) pre treatment: 0/10  Pain Level (0-10 scale) post treatment: 0/10  Any medication changes, allergies to medications, adverse drug reactions, diagnosis change, or new procedure performed?:   [] No    [] Yes (see summary sheet for update)  Subjective functional status/changes:   [] No changes reported  Pt reports no pain this morning    OBJECTIVE    30 min Therapeutic Exercise:  [x] See flow sheet :   Rationale: increase ROM and increase strength to improve the patients ability to raise the R arm  25 min Manual Therapy: joint mobilization and PROM R shoulder , rock tool R UT   Rationale: increase ROM to improve the patients ability to raise the R arm    With   [x] TE   [] TA   [] Neuro   [] SC   [] other: Patient Education: [] Review HEP    [] Progressed/Changed HEP based on:   [] positioning   [] body mechanics   [] transfers   [] Use of heat/ice     [] other:         Other Objective/Functional Measures:   Finger ladder, improved to #23 with flexion, #22-23 with abduction  PROM supine flexion  - 135  PROM external rotation - 25 deg, improved to 40 degrees with contract relax    ASSESSMENT/Changes in Function:   Improvement in range and decrease tightness/pain symptoms reported by pt with PROM and manual therapy techniques. Able to lift higher with finger ladder. Did have increase tightness reported in the sternoclavicular area. Added mobilization at [facility].  Patient will continue to benefit from skilled PT services to modify and progress therapeutic interventions, address ROM deficits, analyze and address soft tissue restrictions and analyze and cue movement patterns to attain remaining goals. GOALS/Progress towards goals:  Patient Goal(s): increase motion    Short Term Goals: To be accomplished in 6 treatments. 1. Patient will be compliant with a progressive HEP. [x] Met [] Not met [] Partially met  9/24/21  2. Patient will demonstrate 10 degree improvement in flexion and abduction R shoulder ROM. [] Met [] Not met [x] Partially met  10/12/21 - improvement >10 deg into abduction but not flexion  Long Term Goals: To be accomplished in 16 treatments. 1. Patient will demo 130 degrees R shoulder elevation without pain. [] Met [x] Not met [] Partially met  10/12/21 75 degrees  2. Patient will demo functional RUE internal rotation AROM to belt line/L5 to assist in dressing. [] Met [] Not met [] Partially met   3. Patient will demo ability to use R hand to do her hair without difficulty.    [] Met [] Not met [] Partially met     PLAN  []  Upgrade activities as tolerated     [x]  Continue plan of care  [x]  Update interventions per flow sheet       []  Discharge due to:_  []  Other:_      Tere Cowden, PT, DPT 10/19/2021

## 2021-10-22 ENCOUNTER — HOSPITAL ENCOUNTER (OUTPATIENT)
Dept: PHYSICAL THERAPY | Age: 61
Discharge: HOME | End: 2021-10-22
Payer: MEDICAID

## 2021-10-22 PROCEDURE — 97110 THERAPEUTIC EXERCISES: CPT

## 2021-10-22 PROCEDURE — 97140 MANUAL THERAPY 1/> REGIONS: CPT

## 2021-10-22 NOTE — PROGRESS NOTES
[address] [address] [facility] [address] [facility], [address] [place] [address]  Ph: [phone]    Fax: [phone]  Therapy Progress Report  Name: Michael Kelsey  : 1960   MD: Petra Lara MD     Medical Diagnosis: Right shoulder pain [M25.511]  Adhesive capsulitis of right shoulder [M75.01]  Start of Care: 21     Visits from Start of Care: 10   Missed Visits: 0  Certification Period: 21 - 21    Frequency/Duration: 2 times a week for 16 treatments   Summary of Care/Goals:  SUBJECTIVE  Pain Level (0-10 scale) pre treatment: 0/10  Pain Level (0-10 scale) post treatment: 0/10  Any medication changes, allergies to medications, adverse drug reactions, diagnosis change, or new procedure performed?:   [x]? No    []? Yes (see summary sheet for update)  Subjective functional status/changes:   []? No changes reported  Pt reporting 70% improvement since starting therapy. Pt reports improvement in driving, combing her hair, putting on her clothes (ie. Pulling up pants). Pt states she still has a habit of using the L arm to do most things but she is trying to use the R arm more. Max R shoulder pain 3-4/10 with exercises.       OBJECTIVE     Other Objective/Functional Measures:   Physical Findings   Ortho:   Posture:  Protracted shoulders  Palpation: significant tightness along the teres major/minor, lats and tricep insertion.   TTP along the lateral border of the scapula  GH hypomobility.       Specific joints: *normal values in ()     SHOULDER                                         AROM                        PROM                       MMT    R L R L R L   Flexion (180) 84 160 126   3- 5   Extension (60) 48 40           Abduction (180) 67 136 82   3- 5   Adduction (0)               IR (70) coccyx T3 42   4+ 5   ER (90) C2 T12 40   4 5   Additional comments: firm end feel in all planes of motion             Ampac Score:  27.64% improved from 49.56%        ASSESSMENT/Changes in Function:   Pt with modest improvements in R shoulder ROM. Pt continues to have significant soft tissue and joint hypomobility/inflexibility limiting ROM. Pt reporting 70% improvement as she has been able to use the R arm more. She continues to have some difficulty with reaching overhead/out to side, combing her hair. She is still relying on the L UE at this time. She will benefit from continued PT services to assist in maximizing her R UE  functional mobility. She has met or partially met all STGs and 2 of 3 LTGs. Patient will continue to benefit from skilled PT services to modify and progress therapeutic interventions, address ROM deficits, analyze and address soft tissue restrictions and analyze and cue movement patterns to attain remaining goals. GOALS/Progress towards goals:  Patient Goal(s): increase motion    Short Term Goals: To be accomplished in 6 treatments. 1. Patient will be compliant with a progressive HEP. [x]? Met []? Not met []? Partially met  9/24/21  2. Patient will demonstrate 10 degree improvement in flexion and abduction R shoulder ROM. []? Met []? Not met [x]? Partially met  10/22/21 14 deg improvement in flexion and 9 deg improvement in abduction      Long Term Goals: To be accomplished in 16 treatments. 1. Patient will demo 130 degrees R shoulder elevation without pain. []? Met [x]? Not met []? Partially met  10/22/21 80 deg   2. Patient will demo functional RUE internal rotation AROM to belt line/L5 to assist in dressing. []? Met []? Not met [x]? Partially met  10/22/21 improving now reaching to coccyx   3. Patient will demo ability to use R hand to do her hair without difficulty. []? Met []? Not met [x]?  Partially met  - 10/22/21 pt reporting a lot of difficulty still with combing her hair but it is better than it was      Recommendations: continue current POC   [x]  Plan of care has been reviewed with EUN Orozco, PT, DPT 10/22/2021     ________________________________________________________________________     Please retain this original for your records.

## 2021-10-22 NOTE — PROGRESS NOTES
PT DAILY TREATMENT NOTE  NON [facility]     Patient Name: Michael November  Date:10/22/2021  : 1960  [x]  Patient  Verified  Payor: Tre Vargas / Plan: 1 [facility] / Product Type: Managed Care Medicaid /    Treatment Area: Right shoulder pain [M25.511]  Adhesive capsulitis of right shoulder [M75.01]       Next MD APPT: as needed  In time: 10:33am  Out time: 11:19am  Total Treatment Time (min):46  Visit #: 10/16    SUBJECTIVE  Pain Level (0-10 scale) pre treatment: 0/10  Pain Level (0-10 scale) post treatment: 0/10  Any medication changes, allergies to medications, adverse drug reactions, diagnosis change, or new procedure performed?:   [x] No    [] Yes (see summary sheet for update)  Subjective functional status/changes:   [] No changes reported  Pt reporting 70% improvement since starting therapy. Pt reports improvement in driving, combing her hair, putting on her clothes (ie. Pulling up pants). Pt states she still has a habit of using the L arm to do most things but she is trying to use the R arm more. Max R shoulder pain 3-4/10 with exercises. OBJECTIVE    26 min Therapeutic Exercise:  [x] See flow sheet : reassessment    Rationale: increase ROM and increase strength to improve the patients ability to raise the R arm  20 min Manual Therapy: Trigger point release/STM lateral aspect of scapula, post deltoid and tricep in L sidelying position with R UE resting on table. Rationale: increase ROM to improve the patients ability to raise the R arm    With   [x] TE   [] TA   [] Neuro   [] SC   [] other: Patient Education: [] Review HEP    [] Progressed/Changed HEP based on:   [] positioning   [] body mechanics   [] transfers   [] Use of heat/ice     [] other:         Other Objective/Functional Measures:   Physical Findings   Ortho:   Posture:  Protracted shoulders  Palpation: significant tightness along the teres major/minor, lats and tricep insertion.   TTP along the lateral border of the scapula  GH hypomobility.       Specific joints: *normal values in ()     SHOULDER                                         AROM                        PROM                       MMT    R L R L R L   Flexion (180) 84 160 126   3- 5   Extension (60) 48 40           Abduction (180) 67 136 82   3- 5   Adduction (0)               IR (70) coccyx T3 42   4+ 5   ER (90) C2 T12 40   4 5   Additional comments: firm end feel in all planes of motion             [facility] Score:  27.64% improved from 49.56%      ASSESSMENT/Changes in Function:   Pt with modest improvements in R shoulder ROM. Pt continues to have significant soft tissue and joint hypomobility/inflexibility limiting ROM. Pt reporting 70% improvement as she has been able to use the R arm more. She continues to have some difficulty with reaching overhead/out to side, combing her hair. She is still relying on the L UE at this time. She will benefit from continued PT services to assist in maximizing her R UE  functional mobility. She has met or partially met all STGs and 2 of 3 LTGs. Patient will continue to benefit from skilled PT services to modify and progress therapeutic interventions, address ROM deficits, analyze and address soft tissue restrictions and analyze and cue movement patterns to attain remaining goals. GOALS/Progress towards goals:  Patient Goal(s): increase motion    Short Term Goals: To be accomplished in 6 treatments. 1. Patient will be compliant with a progressive HEP. [x] Met [] Not met [] Partially met  9/24/21  2. Patient will demonstrate 10 degree improvement in flexion and abduction R shoulder ROM. [] Met [] Not met [x] Partially met  10/22/21 14 deg improvement in flexion and 9 deg improvement in abduction     Long Term Goals: To be accomplished in 16 treatments. 1. Patient will demo 130 degrees R shoulder elevation without pain. [] Met [x] Not met [] Partially met  10/22/21 80 deg   2. Patient will demo functional RUE internal rotation AROM to belt line/L5 to assist in dressing. [] Met [] Not met [x] Partially met  10/22/21 improving now reaching to coccyx   3. Patient will demo ability to use R hand to do her hair without difficulty.    [] Met [] Not met [x] Partially met  - 10/22/21 pt reporting a lot of difficulty still with combing her hair but it is better than it was     PLAN  [x]  Upgrade activities as tolerated     [x]  Continue plan of care  [x]  Update interventions per flow sheet       []  Discharge due to:_  [x]  Other:_  Focus on soft tissue mobilization of the lateral aspect of the scapula (best performed in L sidelying position with arm resting on table)     Elisa Astorga, PT, DPT 10/22/2021

## 2021-10-26 ENCOUNTER — HOSPITAL ENCOUNTER (OUTPATIENT)
Dept: PHYSICAL THERAPY | Age: 61
Discharge: HOME | End: 2021-10-26
Payer: MEDICAID

## 2021-10-26 PROCEDURE — 97140 MANUAL THERAPY 1/> REGIONS: CPT

## 2021-10-26 PROCEDURE — 97110 THERAPEUTIC EXERCISES: CPT

## 2021-10-26 NOTE — PROGRESS NOTES
PT DAILY TREATMENT NOTE  NON [facility]     Patient Name: Ariel Padgett  Date:10/26/2021  : 1960  [x]  Patient  Verified  Payor: Denise Vegas / Plan: 1 [facility] 270 / Product Type: Managed Care Medicaid /    Treatment Area: Right shoulder pain [M25.511]  Adhesive capsulitis of right shoulder [M75.01]       Next MD APPT: as needed  In time: 10:45 am  Out time: 11:30 am  Total Treatment Time (min):45 min  Visit #:     SUBJECTIVE  Pain Level (0-10 scale) pre treatment: 0/10  Pain Level (0-10 scale) post treatment: 0/10  Any medication changes, allergies to medications, adverse drug reactions, diagnosis change, or new procedure performed?:   [x] No    [] Yes (see summary sheet for update)  Subjective functional status/changes:   [] No changes reported  Pt reporting no pain prior to treatment session. \"It's a so so day. \" Slight discomfort post session. OBJECTIVE    25 min Therapeutic Exercise:  [x] See flow sheet : reassessment    Rationale: increase ROM and increase strength to improve the patients ability to raise the R arm  20 min Manual Therapy: Trigger point release/STM lateral aspect of scapula, post deltoid and tricep in L sidelying position with R UE resting on table. Rationale: increase ROM to improve the patients ability to raise the R arm    With   [x] TE   [] TA   [] Neuro   [] SC   [] other: Patient Education: [] Review HEP    [] Progressed/Changed HEP based on:   [] positioning   [] body mechanics   [] transfers   [] Use of heat/ice     [] other:         Other Objective/Functional Measures: Added shoulder depression with arm supported on table    ASSESSMENT/Changes in Function:   Patient very TTP along Axillary and lateral aspect of scapula. Patient able to tolerate MT to lateral aspect of border with rock tool, decrease in discomfort noted. Tightness also noted which decrease in ROM. Performed shoulder depression with arm supported on table for HEP. Patient reports pain and discomfort during MT but pain goes away instantly. Patient will continue to benefit from skilled PT services to modify and progress therapeutic interventions, address ROM deficits, analyze and address soft tissue restrictions and analyze and cue movement patterns to attain remaining goals. GOALS/Progress towards goals:  Patient Goal(s): increase motion    Short Term Goals: To be accomplished in 6 treatments. 1. Patient will be compliant with a progressive HEP. [x] Met [] Not met [] Partially met  9/24/21  2. Patient will demonstrate 10 degree improvement in flexion and abduction R shoulder ROM. [] Met [] Not met [x] Partially met  10/22/21 14 deg improvement in flexion and 9 deg improvement in abduction     Long Term Goals: To be accomplished in 16 treatments. 1. Patient will demo 130 degrees R shoulder elevation without pain. [] Met [x] Not met [] Partially met  10/22/21 80 deg   2. Patient will demo functional RUE internal rotation AROM to belt line/L5 to assist in dressing. [] Met [] Not met [x] Partially met  10/22/21 improving now reaching to coccyx   3. Patient will demo ability to use R hand to do her hair without difficulty.    [] Met [] Not met [x] Partially met  - 10/22/21 pt reporting a lot of difficulty still with combing her hair but it is better than it was     PLAN  [x]  Upgrade activities as tolerated     [x]  Continue plan of care  [x]  Update interventions per flow sheet       []  Discharge due to:_  [x]  Other:_  Focus on soft tissue mobilization of the lateral aspect of the scapula (best performed in L sidelying position with arm resting on table)     Freida Andre, PTA 10/26/2021

## 2021-10-29 ENCOUNTER — HOSPITAL ENCOUNTER (OUTPATIENT)
Dept: PHYSICAL THERAPY | Age: 61
Discharge: HOME | End: 2021-10-29
Payer: MEDICAID

## 2021-10-29 PROCEDURE — 97140 MANUAL THERAPY 1/> REGIONS: CPT

## 2021-10-29 PROCEDURE — 97110 THERAPEUTIC EXERCISES: CPT

## 2021-10-29 NOTE — PROGRESS NOTES
PT DAILY TREATMENT NOTE  NON [facility]     Patient Name: Latanya Nielsen  Date:10/29/2021  : 1960  [x]  Patient  Verified  Payor: Galileo Patel / Plan: 1 [place] [id number] / Product Type: Managed Care Medicaid /    Treatment Area: Right shoulder pain [M25.511]  Adhesive capsulitis of right shoulder [M75.01]       Next MD APPT: as needed  In time: 4:06pm  Out time: 5:15pm  Total Treatment Time (min):69  Visit #:     SUBJECTIVE  Pain Level (0-10 scale) pre treatment: 0/10  Pain Level (0-10 scale) post treatment: 0/10  Any medication changes, allergies to medications, adverse drug reactions, diagnosis change, or new procedure performed?:   [x] No    [] Yes (see summary sheet for update)  Subjective functional status/changes:   [] No changes reported  Pt reporting no pain prior to treatment session. \"It's a so so day. \" Slight discomfort post session.     OBJECTIVE  Modality rationale: increase tissue extensibility to improve the patients ability to move the R arm    Min Type Additional Details       [] Estim: []Att   []Unatt    []TENS instruct                  []IFC  []Premod   []NMES                     []Other:  []w/US   []w/ice   []w/heat  Position:  Location:       []  Traction: [] Cervical       []Lumbar                       [] Prone          []Supine                       []Intermittent   []Continuous Lbs:  [] before manual  [] after manual  []w/heat    []  Ultrasound: []Continuous   [] Pulsed                       at: []1MHz   []3MHz Location:  W/cm2:    [] Paraffin         Location:   []w/heat   15 []  Ice     [x]  Heat  []  Ice massage Position: supine   Location: r shoulder     []  Laser  []  Other: Position:  Location:      []  Vasopneumatic Device Pressure:       [] lo [] med [] hi   Temperature:      [x] Skin assessment post-treatment:  [x]intact []redness- no adverse reaction    []redness  adverse reaction:       39 min Therapeutic Exercise:  [x] See flow sheet : reassessment    Rationale: increase ROM and increase strength to improve the patients ability to raise the R arm  15 min Manual Therapy: [address] mobs and passive stretching of the R shoulder      Rationale: increase ROM to improve the patients ability to raise the R arm    With   [x] TE   [] TA   [] Neuro   [] SC   [] other: Patient Education: [x] Review HEP    [] Progressed/Changed HEP based on:   [] positioning   [] body mechanics   [] transfers   [] Use of heat/ice     [] other:         Other Objective/Functional Measures: Added cane stretches with prolong hold (30s). Performed [address] mobs in prone position followed by passive stretching. Passive shoulder flexion 125     ASSESSMENT/Changes in Function:   Pt continues to have [address] hypomobility and decrease soft tissue mobilization throughout the R shoulder. She is progressing slowly at this time but has been able to perform exercises without significant pain. Will continue to work on soft tissue/joint mobilization. Patient will continue to benefit from skilled PT services to modify and progress therapeutic interventions, address ROM deficits, analyze and address soft tissue restrictions and analyze and cue movement patterns to attain remaining goals. GOALS/Progress towards goals:  Patient Goal(s): increase motion    Short Term Goals: To be accomplished in 6 treatments. 1. Patient will be compliant with a progressive HEP. [x] Met [] Not met [] Partially met  9/24/21  2. Patient will demonstrate 10 degree improvement in flexion and abduction R shoulder ROM. [] Met [] Not met [x] Partially met  10/22/21 14 deg improvement in flexion and 9 deg improvement in abduction     Long Term Goals: To be accomplished in 16 treatments. 1. Patient will demo 130 degrees R shoulder elevation without pain. [] Met [x] Not met [] Partially met  10/22/21 80 deg   2. Patient will demo functional RUE internal rotation AROM to belt line/L5 to assist in dressing.    [] Met [] Not met [x] Partially met  10/22/21 improving now reaching to coccyx   3. Patient will demo ability to use R hand to do her hair without difficulty.    [] Met [] Not met [x] Partially met  - 10/22/21 pt reporting a lot of difficulty still with combing her hair but it is better than it was     PLAN  [x]  Upgrade activities as tolerated     [x]  Continue plan of care  [x]  Update interventions per flow sheet       []  Discharge due to:_  [x]  Other:_  Focus on soft tissue mobilization of the lateral aspect of the scapula (best performed in L sidelying position with arm resting on table)     Elisa Buck, PT, DPT 10/29/2021

## 2021-11-02 ENCOUNTER — HOSPITAL ENCOUNTER (OUTPATIENT)
Dept: PHYSICAL THERAPY | Age: 61
Discharge: HOME | End: 2021-11-02
Payer: MEDICAID

## 2021-11-02 PROCEDURE — 97110 THERAPEUTIC EXERCISES: CPT

## 2021-11-02 PROCEDURE — 97014 ELECTRIC STIMULATION THERAPY: CPT

## 2021-11-02 PROCEDURE — 97140 MANUAL THERAPY 1/> REGIONS: CPT

## 2021-11-02 NOTE — PROGRESS NOTES
PT DAILY TREATMENT NOTE  NON [facility]     Patient Name: Michael Power  Date:2021  : 1960  [x]  Patient  Verified  Payor: Estevan Patel / Plan: 1 [facility] 270 / Product Type: Managed Care Medicaid /    Treatment Area: Right shoulder pain [M25.511]  Adhesive capsulitis of right shoulder [M75.01]       Next MD APPT: as needed  In time: 1054am  Out time: 1142am  Total Treatment Time (min): 46  Visit #:     SUBJECTIVE  Pain Level (0-10 scale) pre treatment: 0/10  Pain Level (0-10 scale) post treatment: 0/10  Any medication changes, allergies to medications, adverse drug reactions, diagnosis change, or new procedure performed?:   [x] No    [] Yes (see summary sheet for update)  Subjective functional status/changes:   [] No changes reported  Pt states she is doing ok without pain.     OBJECTIVE  Modality rationale: increase tissue extensibility to improve the patients ability to move the R arm    Min Type Additional Details       [] Estim: []Att   []Unatt    []TENS instruct                  []IFC  []Premod   []NMES                     []Other:  []w/US   []w/ice   []w/heat  Position:  Location:       []  Traction: [] Cervical       []Lumbar                       [] Prone          []Supine                       []Intermittent   []Continuous Lbs:  [] before manual  [] after manual  []w/heat    []  Ultrasound: []Continuous   [] Pulsed                       at: []1MHz   []3MHz Location:  W/cm2:    [] Paraffin         Location:   []w/heat   10 []  Ice     [x]  Heat  []  Ice massage Position: supine   Location: r shoulder     []  Laser  []  Other: Position:  Location:      []  Vasopneumatic Device Pressure:       [] lo [] med [] hi   Temperature:      [x] Skin assessment post-treatment:  [x]intact []redness- no adverse reaction    []redness  adverse reaction:       13 min Therapeutic Exercise:  [x] See flow sheet :    Rationale: increase ROM and increase strength to improve the patients ability to raise the R arm  23 min Manual Therapy: [address] mobs and passive stretching of the R shoulder, STM      Rationale: increase ROM to improve the patients ability to raise the R arm    With   [x] TE   [] TA   [] Neuro   [] SC   [] other: Patient Education: [x] Review HEP    [] Progressed/Changed HEP based on:   [] positioning   [] body mechanics   [] transfers   [] Use of heat/ice     [] other:         Other Objective/Functional Measures: Added open books  PROM ER - 20-25 (90 deg ABD)  PROM ABD - 92  PROM FLEX - 128    ASSESSMENT/Changes in Function:   Pt improved PROM GH ER in 90 deg ABD to 20-25 degrees. Contract/relax with improvement in ROM. Continues with myofascial tightness and firm end feel with all directions of movement. Slow progress but continues without pain symptoms and tolerates treatment well. Patient will continue to benefit from skilled PT services to modify and progress therapeutic interventions, address ROM deficits, analyze and address soft tissue restrictions and analyze and cue movement patterns to attain remaining goals. GOALS/Progress towards goals:  Patient Goal(s): increase motion    Short Term Goals: To be accomplished in 6 treatments. 1. Patient will be compliant with a progressive HEP. [x] Met [] Not met [] Partially met  9/24/21  2. Patient will demonstrate 10 degree improvement in flexion and abduction R shoulder ROM. [] Met [] Not met [x] Partially met  10/22/21 14 deg improvement in flexion and 9 deg improvement in abduction     Long Term Goals: To be accomplished in 16 treatments. 1. Patient will demo 130 degrees R shoulder elevation without pain. [] Met [x] Not met [] Partially met  10/22/21 80 deg   2. Patient will demo functional RUE internal rotation AROM to belt line/L5 to assist in dressing. [] Met [] Not met [x] Partially met  10/22/21 improving now reaching to coccyx   3.  Patient will demo ability to use R hand to do her hair without difficulty.    [] Met [] Not met [x] Partially met  - 10/22/21 pt reporting a lot of difficulty still with combing her hair but it is better than it was     PLAN  [x]  Upgrade activities as tolerated     [x]  Continue plan of care  [x]  Update interventions per flow sheet       []  Discharge due to:_  []  Other:_       Ivan Gilman, PT, DPT 11/2/2021

## 2021-11-05 ENCOUNTER — APPOINTMENT (OUTPATIENT)
Dept: PHYSICAL THERAPY | Age: 61
End: 2021-11-05
Payer: MEDICAID

## 2021-11-10 ENCOUNTER — OFFICE VISIT (OUTPATIENT)
Dept: PODIATRY | Age: 61
End: 2021-11-10
Payer: MEDICAID

## 2021-11-10 VITALS
DIASTOLIC BLOOD PRESSURE: 91 MMHG | HEART RATE: 92 BPM | HEIGHT: 65 IN | TEMPERATURE: 97.1 F | SYSTOLIC BLOOD PRESSURE: 154 MMHG | BODY MASS INDEX: 34.35 KG/M2 | WEIGHT: 206.2 LBS

## 2021-11-10 DIAGNOSIS — L60.3 ONYCHODYSTROPHY: ICD-10-CM

## 2021-11-10 DIAGNOSIS — M72.2 PLANTAR FASCIITIS, BILATERAL: Primary | ICD-10-CM

## 2021-11-10 DIAGNOSIS — E11.9 TYPE 2 DIABETES MELLITUS WITHOUT COMPLICATION, WITHOUT LONG-TERM CURRENT USE OF INSULIN (HCC): ICD-10-CM

## 2021-11-10 PROCEDURE — 99204 OFFICE O/P NEW MOD 45 MIN: CPT | Performed by: PODIATRIST

## 2021-11-10 PROCEDURE — 11721 DEBRIDE NAIL 6 OR MORE: CPT | Performed by: PODIATRIST

## 2021-11-10 RX ORDER — SPIRONOLACTONE 25 MG/1
25 TABLET ORAL DAILY
COMMUNITY
Start: 2021-10-11

## 2021-11-10 RX ORDER — DICLOFENAC SODIUM 10 MG/G
4 GEL TOPICAL 4 TIMES DAILY
Qty: 350 G | Refills: 2 | Status: SHIPPED | OUTPATIENT
Start: 2021-11-10

## 2021-11-10 RX ORDER — PRAVASTATIN SODIUM 40 MG/1
40 TABLET ORAL DAILY
COMMUNITY
Start: 2021-10-11

## 2021-11-10 RX ORDER — POTASSIUM CHLORIDE 1500 MG/1
20 TABLET, FILM COATED, EXTENDED RELEASE ORAL DAILY
COMMUNITY

## 2021-11-10 RX ORDER — METFORMIN HYDROCHLORIDE 500 MG/1
500 TABLET ORAL 2 TIMES DAILY WITH MEALS
COMMUNITY
End: 2021-11-10 | Stop reason: SDUPTHER

## 2021-11-10 RX ORDER — AMLODIPINE BESYLATE 10 MG/1
10 TABLET ORAL DAILY
COMMUNITY
Start: 2021-09-21

## 2021-11-10 RX ORDER — METFORMIN HYDROCHLORIDE 500 MG/1
500 TABLET, EXTENDED RELEASE ORAL DAILY
COMMUNITY

## 2021-11-10 NOTE — PROGRESS NOTES
1. Have you been to the ER, urgent care clinic since your last visit? Hospitalized since your last visit? No    2. Have you seen or consulted any other health care providers outside of the [address] [place] [address] since your last visit? Include any pap smears or colon screening.  No     Chief Complaint   Patient presents with    Diabetic Foot Exam     last pcp visit 10/2021; last glucose 135

## 2021-11-10 NOTE — PROGRESS NOTES
[facility]    Subjective:         Patient is a 59 y.o. female who is being seen as a new pt for diabetic pedal exam.  Patient states she has close follow-up with her PCP (Dr. Sunil Alvarez). She states her last office visit with her PCP was 10/13/2021. She describes her diabetes as being under control, but cannot recall her last A1c. She admits to mild pedal pain, rising to level of 3-10. She states pain is located to her bilateral heels and is worse with her first up in the morning. She denies any changes in her activity level or shoe gear. She describes the pain is sharp in nature nonradiating. She denies any recent pedal trauma. She denies any systemic signs of infection but does state her nails are thick/discolored. She denies any other lower extremity complaints    History reviewed. No pertinent past medical history. Past Surgical History:   Procedure Laterality Date    HX BREAST BIOPSY Left 2020       History reviewed. No pertinent family history. Social History     Tobacco Use    Smoking status: Never Smoker    Smokeless tobacco: Never Used   Substance Use Topics    Alcohol use: Never     Allergies   Allergen Reactions    Lisinopril Swelling     Prior to Admission medications    Medication Sig Start Date End Date Taking? Authorizing Provider   potassium chloride SR (K-TAB) 20 mEq tablet Take 20 mEq by mouth daily. Yes Provider, Historical   metFORMIN ER (GLUCOPHAGE XR) 500 mg tablet Take 500 mg by mouth daily. Yes Provider, Historical   pravastatin (PRAVACHOL) 40 mg tablet Take 40 mg by mouth daily. 10/11/21  Yes Provider, Historical   amLODIPine (NORVASC) 10 mg tablet Take 10 mg by mouth daily. 9/21/21  Yes Provider, Historical   spironolactone (ALDACTONE) 25 mg tablet Take 25 mg by mouth daily. 10/11/21  Yes Provider, Historical       Review of Systems   Constitutional: Negative. HENT: Negative. Eyes: Negative. Respiratory: Negative.     Cardiovascular: Positive for leg swelling. Gastrointestinal: Negative. Endocrine: Negative. Genitourinary: Negative. Musculoskeletal: Negative. Skin: Negative. Allergic/Immunologic: Positive for immunocompromised state. Neurological: Negative. Hematological: Negative. Psychiatric/Behavioral: Negative. All other systems reviewed and are negative. Objective:     Visit Vitals  BP (!) 154/91 (BP 1 Location: Right upper arm, BP Patient Position: Sitting, BP Cuff Size: Adult)   Pulse 92   Temp 97.1 °F (36.2 °C) (Temporal)   Ht 5' 5\" (1.651 m)   Wt 206 lb 3.2 oz (93.5 kg)   BMI 34.31 kg/m²       Physical Exam  Vitals reviewed. Constitutional:       Appearance: She is morbidly obese. Cardiovascular:      Pulses:           Dorsalis pedis pulses are 2+ on the right side and 2+ on the left side. Posterior tibial pulses are 2+ on the right side and 2+ on the left side. Pulmonary:      Effort: Pulmonary effort is normal.   Musculoskeletal:      Right lower leg: Edema present. Left lower leg: Edema present. Right foot: Normal range of motion. No deformity or bunion. Left foot: Normal range of motion. No deformity or bunion. Feet:      Right foot:      Protective Sensation: 10 sites tested. 10 sites sensed. Skin integrity: Skin integrity normal.      Toenail Condition: Right toenails are abnormally thick. Left foot:      Protective Sensation: 10 sites tested. 10 sites sensed. Skin integrity: Skin integrity normal.      Toenail Condition: Left toenails are abnormally thick. Lymphadenopathy:      Lower Body: No right inguinal adenopathy. No left inguinal adenopathy. Skin:     General: Skin is warm. Capillary Refill: Capillary refill takes 2 to 3 seconds. Comments: Absent pedal hair growth with hyperpigmentation   Neurological:      Mental Status: She is alert and oriented to person, place, and time.       Comments: Paresthesias noted   Psychiatric: Mood and Affect: Mood and affect normal.         Behavior: Behavior is cooperative. Data Review: No results found for this or any previous visit (from the past 24 hour(s)). Impression:         ICD-10-CM ICD-9-CM    1. Plantar fasciitis, bilateral  M72.2 728.71 REFERRAL TO PHYSICAL THERAPY   2. Type 2 diabetes mellitus without complication, without long-term current use of insulin (HCC)  E11.9 250.00    3. Onychodystrophy  L60.3 703.8          Recommendation:     Patient seen and evaluated in the office  Discussed and educated patient regarding their current medical condition. Instructed patient to monitor their feet daily, be compliant with all medications and wear supportive shoe gear. A sharp toenail plate debridement was performed to all 10 pedal digits with a nail nipper without incident. Patient tolerated well no dressing was needed  Discussed her bilateral heel pain, a prescription was given for Voltaren 1% topical gel to be applied up to 4 times daily for symptomatic relief. She was also given a referral to physical therapy to eval and treat. Lastly she was given a referral for custom orthotics with simone Álvarez, [facility], [address] [place] [address] and [address]  [address] [place] [address] [address], [address]  O: [phone]  F: [phone]  C: [phone]

## 2021-11-17 ENCOUNTER — HOSPITAL ENCOUNTER (OUTPATIENT)
Dept: PHYSICAL THERAPY | Age: 61
Discharge: HOME | End: 2021-11-17
Payer: MEDICAID

## 2021-11-17 PROCEDURE — 97140 MANUAL THERAPY 1/> REGIONS: CPT

## 2021-11-17 PROCEDURE — 97110 THERAPEUTIC EXERCISES: CPT

## 2021-11-17 NOTE — PROGRESS NOTES
PT DAILY TREATMENT NOTE  NON [facility]     Patient Name: Jai Blanco  Date:2021  : 1960  [x]  Patient  Verified  Payor: Mónica Arthur / Plan: 1 [facility] 270 / Product Type: Managed Care Medicaid /    Treatment Area: Right shoulder pain [M25.511]  Adhesive capsulitis of right shoulder [M75.01]       Next MD APPT: as needed  In time: 09:20 am  Out time: 10:00 am  Total Treatment Time (min):40 min  Visit #:     SUBJECTIVE  Pain Level (0-10 scale) pre treatment: 0/10  Pain Level (0-10 scale) post treatment: 0/10  Any medication changes, allergies to medications, adverse drug reactions, diagnosis change, or new procedure performed?:   [x] No    [] Yes (see summary sheet for update)  Subjective functional status/changes:   [] No changes reported  Patient she did not do anything last week. She got a shot in her L arm and it was painful. \"I thought maybe it was going to be like my R shoulder. \"    OBJECTIVE  Modality rationale: increase tissue extensibility to improve the patients ability to move the R arm    Min Type Additional Details       [] Estim: []Att   []Unatt    []TENS instruct                  []IFC  []Premod   []NMES                     []Other:  []w/US   []w/ice   []w/heat  Position:  Location:       []  Traction: [] Cervical       []Lumbar                       [] Prone          []Supine                       []Intermittent   []Continuous Lbs:  [] before manual  [] after manual  []w/heat    []  Ultrasound: []Continuous   [] Pulsed                       at: []1MHz   []3MHz Location:  W/cm2:    [] Paraffin         Location:   []w/heat    []  Ice     []  Heat  []  Ice massage Position: supine   Location: r shoulder     []  Laser  []  Other: Position:  Location:      []  Vasopneumatic Device Pressure:       [] lo [] med [] hi   Temperature:      [] Skin assessment post-treatment:  []intact []redness- no adverse reaction    []redness  adverse reaction:       15 min Therapeutic Exercise:  [x] See flow sheet :    Rationale: increase ROM and increase strength to improve the patients ability to raise the R arm  25 min Manual Therapy: [address] mobs and passive stretching of the R shoulder, STM      Rationale: increase ROM to improve the patients ability to raise the R arm    With   [x] TE   [] TA   [] Neuro   [] SC   [] other: Patient Education: [x] Review HEP    [] Progressed/Changed HEP based on:   [] positioning   [] body mechanics   [] transfers   [] Use of heat/ice     [] other:         Other Objective/Functional Measures:   PROM ER - 20-25 (90 deg ABD)  PROM ABD - 92  PROM FLEX - 128    ASSESSMENT/Changes in Function:   Patient has not been to therapy for 2 weeks. She did not perform HEP while out. Patient was extremely tight during PROM. [place] audible pop with no pain during ABD PROM. Patient had less pain and increase motion after pop. Patient still very limited in all planes passively. Patient will continue to benefit from skilled PT services to modify and progress therapeutic interventions, address ROM deficits, analyze and address soft tissue restrictions and analyze and cue movement patterns to attain remaining goals. GOALS/Progress towards goals:  Patient Goal(s): increase motion    Short Term Goals: To be accomplished in 6 treatments. 1. Patient will be compliant with a progressive HEP. [x] Met [] Not met [] Partially met  9/24/21  2. Patient will demonstrate 10 degree improvement in flexion and abduction R shoulder ROM. [] Met [] Not met [x] Partially met  10/22/21 14 deg improvement in flexion and 9 deg improvement in abduction     Long Term Goals: To be accomplished in 16 treatments. 1. Patient will demo 130 degrees R shoulder elevation without pain. [] Met [x] Not met [] Partially met  10/22/21 80 deg   2. Patient will demo functional RUE internal rotation AROM to belt line/L5 to assist in dressing.    [] Met [] Not met [x] Partially met  10/22/21 improving now reaching to coccyx   3. Patient will demo ability to use R hand to do her hair without difficulty.    [] Met [] Not met [x] Partially met  - 10/22/21 pt reporting a lot of difficulty still with combing her hair but it is better than it was     PLAN  [x]  Upgrade activities as tolerated     [x]  Continue plan of care  [x]  Update interventions per flow sheet       []  Discharge due to:_  []  Other:_       Freida Andre, PTA 11/17/2021

## 2021-11-19 ENCOUNTER — HOSPITAL ENCOUNTER (OUTPATIENT)
Dept: PHYSICAL THERAPY | Age: 61
Discharge: HOME | End: 2021-11-19
Payer: MEDICAID

## 2021-11-19 PROCEDURE — 97110 THERAPEUTIC EXERCISES: CPT

## 2021-11-19 PROCEDURE — 97140 MANUAL THERAPY 1/> REGIONS: CPT

## 2021-11-19 NOTE — PROGRESS NOTES
PT DAILY TREATMENT NOTE  [facility]     Patient Name: Yumi Cummins  Date:2021  : 1960  [x]  Patient  Verified  Payor: [address] [place],9D / Plan: [address] [place] [id number] / Product Type: Managed Care Medicaid /    Treatment Area: Right shoulder pain [M25.511]  Adhesive capsulitis of right shoulder [M75.01]       Next MD APPT: as needed  In time: 09:15 am  Out time: 10:00 am  Total Treatment Time (min):45 min  Visit #: 15/16    SUBJECTIVE  Pain Level (0-10 scale) pre treatment: 0/10  Pain Level (0-10 scale) post treatment: 0/10  Any medication changes, allergies to medications, adverse drug reactions, diagnosis change, or new procedure performed?:   [x] No    [] Yes (see summary sheet for update)  Subjective functional status/changes:   [] No changes reported  Patient reports she is having other health issues with her back and her feet. She states the doctor was supposed to send a script for plantar fasciitis. OBJECTIVE      30 min Therapeutic Exercise:  [x] See flow sheet :    Rationale: increase ROM and increase strength to improve the patients ability to raise the R arm  15 min Manual Therapy: [facility] mobs and passive stretching of the R shoulder, STM      Rationale: increase ROM to improve the patients ability to raise the R arm    With   [x] TE   [] TA   [] Neuro   [] SC   [] other: Patient Education: [x] Review HEP    [] Progressed/Changed HEP based on:   [] positioning   [] body mechanics   [] transfers   [] Use of heat/ice     [] other:         Other Objective/Functional Measures: Added standing wand ER with Assistance  PROM ER - 20-25 (90 deg ABD)  PROM ABD - 92  PROM FLEX - 128   11: 100    ASSESSMENT/Changes in Function:   Patient had difficulty with scapula retraction. Also needed assistance with ER with cane. Patient still very tight in all planes . Able to get 90 degrees Abd today and on flexion 100.  Patient having more pain and difficulty with lumbar and feet today. Patient not compliant with HEP. Will reassess on next visit. .Patient will continue to benefit from skilled PT services to modify and progress therapeutic interventions, address ROM deficits, analyze and address soft tissue restrictions and analyze and cue movement patterns to attain remaining goals. GOALS/Progress towards goals:  Patient Goal(s): increase motion    Short Term Goals: To be accomplished in 6 treatments. 1. Patient will be compliant with a progressive HEP. [x] Met [] Not met [] Partially met  9/24/21  2. Patient will demonstrate 10 degree improvement in flexion and abduction R shoulder ROM. [] Met [] Not met [x] Partially met  10/22/21 14 deg improvement in flexion and 9 deg improvement in abduction     Long Term Goals: To be accomplished in 16 treatments. 1. Patient will demo 130 degrees R shoulder elevation without pain. [] Met [x] Not met [] Partially met  10/22/21 80 deg   2. Patient will demo functional RUE internal rotation AROM to belt line/L5 to assist in dressing. [] Met [] Not met [x] Partially met  10/22/21 improving now reaching to coccyx   3. Patient will demo ability to use R hand to do her hair without difficulty.    [] Met [] Not met [x] Partially met  - 10/22/21 pt reporting a lot of difficulty still with combing her hair but it is better than it was     PLAN  [x]  Upgrade activities as tolerated     [x]  Continue plan of care  [x]  Update interventions per flow sheet       []  Discharge due to:_  [x]  Other:_ need to reassess on next visit      Jus Overton, PTA 11/19/2021

## 2021-11-24 ENCOUNTER — APPOINTMENT (OUTPATIENT)
Dept: PHYSICAL THERAPY | Age: 61
End: 2021-11-24
Payer: MEDICAID

## 2021-12-01 ENCOUNTER — HOSPITAL ENCOUNTER (OUTPATIENT)
Dept: PHYSICAL THERAPY | Age: 61
Discharge: HOME | End: 2021-12-01
Payer: MEDICAID

## 2021-12-01 PROCEDURE — 97110 THERAPEUTIC EXERCISES: CPT

## 2021-12-01 NOTE — PROGRESS NOTES
[address] [address] [facility] [address] [facility], [address] [place] [address]  Ph: [phone]  Fax: [phone]    Discharge Summary 2-15    Patient name: Lalo Gonzalez  : 1960  Provider#: [id number]  Referral source: Keyanna Sanchez MD      Medical/Treatment Diagnosis: Right shoulder pain [M25.511]  Adhesive capsulitis of right shoulder [M75.01]     Prior Hospitalization: see medical history     Comorbidities: See Plan of Care  Prior Level of Function: See Plan of Care  Medications: Verified on Patient Summary List  Start of Care: 21   Onset Date:2020   Visits from Start of Care: 16 Missed Visits: 2  Certification Period :21 to 21    Assessment/Summary of care/GOALS:    SHOULDER                                         AROM   PROM            MMT   R L R L R L   Flexion (180) 90 150 110 WNL 3- 5   Extension (60)         Abduction (180) 85 145 95 WNL 3 5   Adduction (0)         IR (70) L3 L1 60 WNL 4 5   ER (90) C1 T2 25 WNL 3- 5   Horizontal Abduction (130)         Horizontal Adduction (45)         Additional comments:     Spine:     CERVICAL                                                ROM                                  Strength   Flexion  WFL    Extension 40    Protraction     Retraction        R L R L   Lateral Flexion 30 25     Rotation 50 50     Additional comments:       ASSESSMENT/Changes in Function:    Patient reports she is having other health issues with her back and her feet. Reports she can drive better and get dressed without difficulty now. Still has difficulty with fixing her hair and reaching overhead ( above 90 ). She stated she is going to call the doctor and schedule the surgery for after the holidays. \"I'm going to get it done. \"   Patient has been to therapy a total of 16 visits since September. She does see some improvement with ADL's below 90 degrees. She has also seen improvement with driving as well.  She has a HEP and will continue on her own. Plan to discharge her at present time. GOALS/Progress towards goals:  Patient Goal(s): increase motion    Short Term Goals: To be accomplished in 6 treatments. 1. Patient will be compliant with a progressive HEP. [x] Met [] Not met [] Partially met  9/24/21  2. Patient will demonstrate 10 degree improvement in flexion and abduction R shoulder ROM. [] Met [] Not met [x] Partially met  10/22/21 14 deg improvement in flexion and 9 deg improvement in abduction     Long Term Goals: To be accomplished in 16 treatments. 1. Patient will demo 130 degrees R shoulder elevation without pain. [] Met [x] Not met [] Partially met     2. Patient will demo functional RUE internal rotation AROM to belt line/L5 to assist in dressing. [x] Met [] Not met [] Partially met   3. Patient will demo ability to use R hand to do her hair without difficulty.    [] Met [] Not met [x] Partially met  - 10/22/21 pt reporting a lot of difficulty still with combing her hair but it is better than it was         Ampac Score:  30.20%  RECOMMENDATIONS:  [x]Discontinue therapy: []Patient has reached or is progressing toward set goals and is independent with HEP     []Patient is non-compliant or has abdicated     []Due to lack of appreciable progress towards set goals     [x]Other Follow up with MD ( surgery )    Gabi Rodriguez, PTA 12/1/2021

## 2021-12-01 NOTE — PROGRESS NOTES
PT DAILY TREATMENT NOTE  [facility]     Patient Name: Colonel Cerna  Date:2021  : 1960  [x]  Patient  Verified  Payor: [employer] / Plan: 1 [facility] 270 / Product Type: Managed Care Medicaid /    Treatment Area: Right shoulder pain [M25.511]  Adhesive capsulitis of right shoulder [M75.01]       Next MD APPT: as needed  In time:01:50 pm  Out time: 02:30 pm  Total Treatment Time (min):45 min  Visit #:     SUBJECTIVE  Pain Level (0-10 scale) pre treatment: 0/10  Pain Level (0-10 scale) post treatment: 0/10  Any medication changes, allergies to medications, adverse drug reactions, diagnosis change, or new procedure performed?:   [x] No    [] Yes (see summary sheet for update)  Subjective functional status/changes:   [] No changes reported  Patient reports she is having other health issues with her back and her feet. Reports she can drive better and get dressed without difficulty now. Still has difficulty with fixing her hair, reaching overhead ( above 90 ). She stated she is going to call the doctor and schedule the surgery for after the holidays. \"I'm going to get it done. \"   OBJECTIVE      45 min Therapeutic Exercise:  [x] See flow sheet :    Rationale: increase ROM and increase strength to improve the patients ability to raise the R arm      With   [x] TE   [] TA   [] Neuro   [] SC   [] other: Patient Education: [x] Review HEP    [] Progressed/Changed HEP based on:   [] positioning   [] body mechanics   [] transfers   [] Use of heat/ice     [] other:         Other Objective/Functional Measures:     SHOULDER                                         AROM   PROM            MMT   R L R L R L   Flexion (180) 90 150 110 WNL 3- 5   Extension (60)         Abduction (180) 85 145 95 WNL 3 5   Adduction (0)         IR (70) L3 L1 60 WNL 4 5   ER (90) C1 T2 25 WNL 3- 5   Horizontal Abduction (130)         Horizontal Adduction (45)         Additional comments:     Spine: CERVICAL                                                ROM                                  Strength   Flexion  WFL    Extension 40    Protraction     Retraction        R L R L   Lateral Flexion 30 25     Rotation 50 50     Additional comments:       ASSESSMENT/Changes in Function:   Patient has been to therapy a total of 16 visits since September. Advised patient to follow up with MD.  She reports she is going to follow up with MD concerning surgery after the holidays. She does see some improvement with ADL's below 90 degrees. She has also seen improvement with driving as well. She has a HEP and will continue on her own. Plan to discharge her at present time. GOALS/Progress towards goals:  Patient Goal(s): increase motion    Short Term Goals: To be accomplished in 6 treatments. 1. Patient will be compliant with a progressive HEP. [x] Met [] Not met [] Partially met  9/24/21  2. Patient will demonstrate 10 degree improvement in flexion and abduction R shoulder ROM. [] Met [] Not met [x] Partially met  10/22/21 14 deg improvement in flexion and 9 deg improvement in abduction     Long Term Goals: To be accomplished in 16 treatments. 1. Patient will demo 130 degrees R shoulder elevation without pain. [] Met [x] Not met [] Partially met     2. Patient will demo functional RUE internal rotation AROM to belt line/L5 to assist in dressing. [x] Met [] Not met [] Partially met     3. Patient will demo ability to use R hand to do her hair without difficulty.    [] Met [] Not met [x] Partially met  - 10/22/21 pt reporting a lot of difficulty still with combing her hair but it is better than it was     PLAN  []  Upgrade activities as tolerated     []  Continue plan of care  []  Update interventions per flow sheet       [x]  Discharge due to:_plateau and will follow up with MD (surgery)  []  Other:_      Augustina Whitaker, PTA 12/1/2021

## 2021-12-03 ENCOUNTER — HOSPITAL ENCOUNTER (OUTPATIENT)
Dept: PHYSICAL THERAPY | Age: 61
Discharge: HOME | End: 2021-12-03
Payer: MEDICAID

## 2021-12-03 PROCEDURE — 97161 PT EVAL LOW COMPLEX 20 MIN: CPT

## 2021-12-03 PROCEDURE — 97110 THERAPEUTIC EXERCISES: CPT

## 2021-12-03 NOTE — PROGRESS NOTES
[address] [address] [facility] [address] [facility], [address] [place] [address]  Ph: [phone]    Fax: [phone]    Initial Evaluation/Plan of Care/Statement of Necessity for Physical Therapy Services     Patient name: Yumi Cummins    Date/Start of Care:12/3/2021  : 1960  [x]  Patient  Verified Provider#: [id number]          Referral source: Tereza Barry DPM Return visit to MD: 6 months     Medical/Treatment Diagnosis: Plantar fascial fibromatosis [M72.2]    Payor: [facility] / Plan: [address] [place] [address] / Product Type: Managed Care Medicaid /       Prior Hospitalization: see medical history     Comorbidities: DM, HTN, arthritis, shoulder pain  Prior Level of Function: independent  Medications: Verified on Patient Summary List       Patient / Family readiness to learn indicated by: asking questions and trying to perform skills  Persons(s) to be included in education: patient (P)  Barriers to Learning/Limitations: None  Patient Self Reported Health Status: fair  Rehabilitation Potential: fair  Previous Treatment/Compliance: poor compliance with inserts  Work Hx: retired  Barriers to progress: LBP  Motivation: good  Substance use: none reported  Cognition: A & O x 4   Onset Date: chronic    Visit #:     SUBJECTIVE  Pt states she has been having bilateral foot pain for about 10 years. 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 She has been prescribed inserts which she used to wear consistently at work and this really helped. She does not wear her inserts frequently anymore since she retired. She states the foot pain does not bother her much anymore except if she walks for extended periods, like 30 minutes in the grocery store. She is more concerned because just in the past few months she is starting to have lower back pain, which her doctor stated was likely causing her LBP.  Back pain feels like a \"tiredness\" and is worse when she stands long periods, worse on the L side. PAIN  Area of pain: L plantar fascia      Pain Level (0-10 scale): 5-6/10 with activity  Aggravating factors: walking over 30 mins, standing  Things that ease pain: resting    OBJECTIVE:   Physical Findings   Ortho:   Posture: Increase B pronation and navicular drop; calcaneal varus in NWB  Gait and Functional Mobility:  independent  Palpation: mild TTP L plantar fascia and medial calcaneal tubercle  Joint mobility: talocrural WNL; metatarsal stiffness with 1st/2nd digits on L  Leg length: RLE is 1 inch/2.5 cm longer than LLE in supine and long sit  Pelvic alignment: equal  Swelling: none   Specific joints: *normal values in ()  ANKLE                               AROM                     PROM                     MMT:   R L R L R L   Dorsiflexion (15)  -11 -8 3 3 5 5   Plantarflexion (50) 55 60   NT NT   Inversion (35)  40 24   5 5   Eversion (25) 20 18   5 5   Additional comments: no pain  KNEE         AROM          PROM                       MMT   R L R L R L   Extension (0)      5 5   Flexion (145)     5 4   Hamstring flexibility (90/90): R- 32 deg / L - 30 deg  Hip      AROM       PROM             MMT   R L R L R L   Flexion (120)     5 5   Extension (15)     4 4   Abduction (40)     5 5   Adduction (30)         Additional comments: mild back discomfort      Neurological: Reflexes / Sensations: intact  Special Tests: n/a     Ampac Score:   29.77%    ASSESSMENT/Changes in Function:   Pt is a 59year old female presenting to outpatient PT services with diagnosis of bilateral plantar fasciitis. Pt also complains of back discomfort especially on the L side which may be related to plantar fasciitis and leg length discrepancy. With examination, noted calf tightness, decreased DF, L foot stiffness, and leg length discrepancy likely contributing to symptoms. Pt may benefit from heel lift while she awaits getting custom orthotics.  Pt noted significant expense of inserts and may have to wait until next year to afford them. Pt will benefit from skilled PT services to address impairments and allow return to max level of function. Problem List/Impairments: pain affecting function, decrease ROM, decrease strength, impaired gait/ balance, decrease activity tolerance and decrease flexibility/ joint mobility  Treatment Plan may include any combination of the following: Therapeutic exercise, Neuromuscular re-education, Physical agent/modality, Gait/balance training, Manual therapy and Patient education  Patient/ Caregiver education and instruction: self care, exercises and other heel lift  Frequency / Duration: Patient to be seen 2 times per week for 8-12 treatments. Certification Period: 12/3/21 - 3/3/22    Patient Goal (s): relieve back pain    [] Met [] Not met [] Partially met   Short Term Goals: To be accomplished in 4 treatments. 1. Patient will be compliant with a progressive HEP. 2. Patient will obtain heel lift or custom inserts. Long Term Goals: To be accomplished in 8-12 treatments. 1. Patient will be able to walk 45 minutes in stores without discomfort/pain. 2. Patient will improve ankle DF by 5 degrees. TODAY'S TREATMENT:  In time:0935am   Out time:1015am  Total Treatment Time (min): 40   Total Timed Codes (min): 9    Pain Level (0-10 scale) pre treatment: 0/10     Pain Level (0-10 scale) post treatment: 0/10    9 min Therapeutic Exercise:  [x] See flow sheet : Review HEP and provided Handout   Rationale: increase ROM, increase strength, improve balance and increase proprioception to improve the patients ability to ambulate with less pain     With   [x] TE   [] TA   [] Neuro   [] SC   [] other: Patient Education: [x] Review HEP    [] Progressed/Changed HEP based on:   [] positioning   [] body mechanics   [] transfers   [x] heat/ice application    [x] other: frozen water bottle on foot     [x]  Plan of care has been reviewed with PTA.  The Plan of Care is based on information from the initial evaluation. Surinder Patiño, PT, DPT 12/3/2021   ________________________________________________________________________    I certify that the above Therapy Services are being furnished while the patient is under my care. I agree with the treatment plan and certify that this therapy is necessary.     [de-identified] Signature:_________________________________________________  Date:____________Time: ____________     Lisa Westbrook DPM

## 2021-12-07 ENCOUNTER — HOSPITAL ENCOUNTER (OUTPATIENT)
Dept: PHYSICAL THERAPY | Age: 61
Discharge: HOME | End: 2021-12-07
Payer: MEDICAID

## 2021-12-07 ENCOUNTER — APPOINTMENT (OUTPATIENT)
Dept: PHYSICAL THERAPY | Age: 61
End: 2021-12-07
Payer: MEDICAID

## 2021-12-07 PROCEDURE — 97110 THERAPEUTIC EXERCISES: CPT

## 2021-12-07 NOTE — PROGRESS NOTES
PT DAILY TREATMENT NOTE  NON [facility]     Patient Name: Hallie Forte  Date:2021  : 1960  [x]  Patient  Verified  Payor: [employer] MEDICAID / Plan: [address] [place] [id number] / Product Type: Managed Care Medicaid /    Treatment Area: Plantar fascial fibromatosis [M72.2]       Next MD APPT:   In time:1120am  Out time:1201pm  Total Treatment Time (min): 41  Visit #: -   SUBJECTIVE  Pain Level (0-10 scale) pre treatment: 2/10 back      Pain Level (0-10 scale) post treatment: 2/10  Any medication changes, allergies to medications, adverse drug reactions, diagnosis change, or new procedure performed?:   [] No    [] Yes (see summary sheet for update)  Subjective functional status/changes:   [] No changes reported  Pt got a heel lift but states the gel felt funny in her shoe. She has started back wearing her old inserts which include a lift on the L side. OBJECTIVE    41 min Therapeutic Exercise:  [x] See flow sheet :   Rationale: increase ROM, increase strength and improve balance to improve the patients ability to ambulate with less pain    With   [] TE   [] TA   [] Neuro   [] SC   [] other: Patient Education: [] Review HEP    [] Progressed/Changed HEP based on:   [] positioning   [] body mechanics   [] transfers   [] Use of heat/ice     [] other:         Other Objective/Functional Measures: continued with progression of calf/plantar fascia stretches, added back stretches for HEP     ASSESSMENT/Changes in Function:   Pt continues without pain in her feet, but reports mild to moderate back pain while walking. She is wearing her inserts today which include L heel lift. Reinforced importance of trying to wear the heel lift daily for a few weeks to see if it will help alleviate back symptoms. Also reinforced calf stretching for HEP.   Patient will continue to benefit from skilled PT services to modify and progress therapeutic interventions, address ROM deficits and analyze and address soft tissue restrictions to attain remaining goals. GOALS/Progress towards goals:  Patient Goal (s): relieve back pain    []? Met []? Not met []? Partially met   Short Term Goals: To be accomplished in 4 treatments. 1. Patient will be compliant with a progressive HEP. [] Met [] Not met [] Partially met   2. Patient will obtain heel lift or custom inserts. [] Met [] Not met [x] Partially met    - wearing her old inserts 12/7  Long Term Goals: To be accomplished in 8-12 treatments. 1. Patient will be able to walk 45 minutes in stores without discomfort/pain. 2. Patient will improve ankle DF by 5 degrees.     PLAN  []  Upgrade activities as tolerated     [x]  Continue plan of care  []  Update interventions per flow sheet       []  Discharge due to:_  []  Other:_      Maxwell Parr, PT, DPT 12/7/2021

## 2021-12-09 ENCOUNTER — HOSPITAL ENCOUNTER (OUTPATIENT)
Dept: PHYSICAL THERAPY | Age: 61
Discharge: HOME | End: 2021-12-09
Payer: MEDICAID

## 2021-12-09 PROCEDURE — 97110 THERAPEUTIC EXERCISES: CPT

## 2021-12-09 NOTE — PROGRESS NOTES
PT DAILY TREATMENT NOTE  NON [facility]     Patient Name: Danae Person  Date:2021  : 1960  [x]  Patient  Verified  Payor: [employer] MEDICAID / Plan: 1 [facility] / Product Type: Managed Care Medicaid /    Treatment Area: Plantar fascial fibromatosis [M72.2]       Next MD APPT:   In time:10:00 am  Out time:10:40 am  Total Treatment Time (min): 40 min  Visit #: 3/8-   SUBJECTIVE  Pain Level (0-10 scale) pre treatment: 3/10 back    Pain Level (0-10 scale) post treatment: 0/10  Any medication changes, allergies to medications, adverse drug reactions, diagnosis change, or new procedure performed?:   [] No    [] Yes (see summary sheet for update)  Subjective functional status/changes:   [] No changes reported  Patient reporting she feel about the same. She is ambulating today with less of an antalgic gait. OBJECTIVE    40 min Therapeutic Exercise:  [x] See flow sheet :   Rationale: increase ROM, increase strength and improve balance to improve the patients ability to ambulate with less pain    With   [] TE   [] TA   [] Neuro   [] SC   [] other: Patient Education: [] Review HEP    [] Progressed/Changed HEP based on:   [] positioning   [] body mechanics   [] transfers   [] Use of heat/ice     [] other:         Other Objective/Functional Measures: continued with progression of calf/plantar fascia stretches, reviewed back exercises    ASSESSMENT/Changes in Function:   Reviewed exercises today with min/mod cues needed. Patient not wear insoles today but ambulating with decrease antalgic gait noted than on previous visit. Decrease in back pain post treatment today. Will continue to increase exercises as tolerated. Patient will continue to benefit from skilled PT services to modify and progress therapeutic interventions, address ROM deficits and analyze and address soft tissue restrictions to attain remaining goals.         GOALS/Progress towards goals:  Patient Goal (s): Monica Mcclellan back pain    []? Met []? Not met []? Partially met   Short Term Goals: To be accomplished in 4 treatments. 1. Patient will be compliant with a progressive HEP. [] Met [] Not met [] Partially met   2. Patient will obtain heel lift or custom inserts. [] Met [] Not met [x] Partially met    - wearing her old inserts 12/7  Long Term Goals: To be accomplished in 8-12 treatments. 1. Patient will be able to walk 45 minutes in stores without discomfort/pain. 2. Patient will improve ankle DF by 5 degrees.     PLAN  [x]  Upgrade activities as tolerated     [x]  Continue plan of care  [x]  Update interventions per flow sheet       []  Discharge due to:_  []  Other:_      Freida Andre, PTA 12/9/2021

## 2021-12-14 ENCOUNTER — HOSPITAL ENCOUNTER (OUTPATIENT)
Dept: PHYSICAL THERAPY | Age: 61
Discharge: HOME | End: 2021-12-14
Payer: MEDICAID

## 2021-12-14 PROCEDURE — 97110 THERAPEUTIC EXERCISES: CPT

## 2021-12-14 NOTE — PROGRESS NOTES
PT DAILY TREATMENT NOTE  NON [facility]     Patient Name: Rusty Mason  Date:2021  : 1960  [x]  Patient  Verified  Payor: [employer] MEDICAID / Plan: 1 [facility] / Product Type: Managed Care Medicaid /    Treatment Area: Plantar fascial fibromatosis [M72.2]       Next MD APPT:   In [facility]  Out time: 1038am  Total Treatment Time (min): 53  Visit #: -   SUBJECTIVE  Pain Level (0-10 scale) pre treatment: 0/10 back    Pain Level (0-10 scale) post treatment: 0/10  Any medication changes, allergies to medications, adverse drug reactions, diagnosis change, or new procedure performed?:   [] No    [] Yes (see summary sheet for update)  Subjective functional status/changes:   [] No changes reported  Patient has been wearing the insoles with good compliance for about 10 days. She notes improvement in ability to walk farther with less back pain. She still is having problems with lifting due to back pain. OBJECTIVE    53 min Therapeutic Exercise:  [x] See flow sheet :   Rationale: increase ROM, increase strength and improve balance to improve the patients ability to ambulate with less pain    With   [] TE   [] TA   [] Neuro   [] SC   [] other: Patient Education: [] Review HEP    [] Progressed/Changed HEP based on:   [] positioning   [] body mechanics   [] transfers   [] Use of heat/ice     [] other:         Other Objective/Functional Measures: continued with progression of calf/plantar fascia stretches, reviewed back exercises    ASSESSMENT/Changes in Function:   Pt continues with no foot pain with calf stretching, exercises, or gait. Reports compliance with inserts. She is weak through the core and hip (difficulty with SLR today), and tight through the lumbar spine and LEs. Overall pt is reporting improved functional mobility, increased gait distance with less back pain.  Patient will continue to benefit from skilled PT services to modify and progress therapeutic interventions, address ROM deficits and analyze and address soft tissue restrictions to attain remaining goals. GOALS/Progress towards goals:  Patient Goal (s): relieve back pain    []? Met []? Not met []? Partially met   Short Term Goals: To be accomplished in 4 treatments. 1. Patient will be compliant with a progressive HEP. [x] Met [] Not met [] Partially met     -12/14  2. Patient will obtain heel lift or custom inserts. [] Met [] Not met [x] Partially met    - wearing her old inserts 12/7  Long Term Goals: To be accomplished in 8-12 treatments. 1. Patient will be able to walk 45 minutes in stores without discomfort/pain. 2. Patient will improve ankle DF by 5 degrees.     PLAN  [x]  Upgrade activities as tolerated     [x]  Continue plan of care  [x]  Update interventions per flow sheet       []  Discharge due to:_  []  Other:_      Tanesha Wall, PT, DPT 12/14/2021

## 2021-12-16 ENCOUNTER — HOSPITAL ENCOUNTER (OUTPATIENT)
Dept: PHYSICAL THERAPY | Age: 61
Discharge: HOME | End: 2021-12-16
Payer: MEDICAID

## 2021-12-16 PROCEDURE — 97110 THERAPEUTIC EXERCISES: CPT

## 2021-12-16 NOTE — PROGRESS NOTES
PT DAILY TREATMENT NOTE  NON [facility]     Patient Name: Geovanna Lopez  Date:2021  : 1960  [x]  Patient  Verified  Payor: Gordon Arceo / Plan: [address] [place] [id number] / Product Type: Managed Care Medicaid /    Treatment Area: Plantar fascial fibromatosis [M72.2]       Next MD APPT:   In [id number]  Out time: 1033am  Total Treatment Time (min): 40  Visit #: -     SUBJECTIVE  Pain Level (0-10 scale) pre treatment: 0/10 back    Pain Level (0-10 scale) post treatment: 0/10  Any medication changes, allergies to medications, adverse drug reactions, diagnosis change, or new procedure performed?:   [] No    [] Yes (see summary sheet for update)  Subjective functional status/changes:   [] No changes reported  Patient states she added a heel lift on top of her old orthotic because she didn't think the insole was enough. She states now she feels even better while walking. OBJECTIVE    40 min Therapeutic Exercise:  [x] See flow sheet :   Rationale: increase ROM, increase strength and improve balance to improve the patients ability to ambulate with less pain    With   [] TE   [] TA   [] Neuro   [] SC   [] other: Patient Education: [] Review HEP    [] Progressed/Changed HEP based on:   [] positioning   [] body mechanics   [] transfers   [] Use of heat/ice     [] other:         Other Objective/Functional Measures: able to perform double/single heel raise without foot pain  Bilateral ankle DF AROM 2-5 degrees    ASSESSMENT/Changes in Function:   Pt continues with no foot pain with calf stretching, heel raises, or gait. Pt is now wearing her old inserts plus a heel lift to equal about 1 inch lift on the LLE to counteract LLD. Pt feels this has benefited her and notes decrease in back pain. Overall pt is approaching discharge as she reports no foot pain at this time.  She will benefit from continued compliance to her LBP exercises, as well as hamstring and calf stretching, and continued use of heel lift. If back pain does not continue to improve she may benefit from PT referral for LBP. Patient will continue to benefit from skilled PT services to modify and progress therapeutic interventions, address ROM deficits and analyze and address soft tissue restrictions to attain remaining goals. GOALS/Progress towards goals:  Patient Goal (s): relieve back pain    []? Met []? Not met []? Partially met   Short Term Goals: To be accomplished in 4 treatments. 1. Patient will be compliant with a progressive HEP. [x] Met [] Not met [] Partially met     -12/14  2. Patient will obtain heel lift or custom inserts. [x] Met [] Not met [] Partially met    - wearing her old inserts and heel lift 12/16  Long Term Goals: To be accomplished in 8-12 treatments. 1. Patient will be able to walk 45 minutes in stores without discomfort/pain. [] Met [x] Not met [] Partially met    - pt states she still has back pain with prolonged walking but no foot pain  2. Patient will improve ankle DF by 5 degrees.  [x] Met [] Not met [] Partially met    -  AROM now past neutral bilaterally    PLAN  [x]  Upgrade activities as tolerated     [x]  Continue plan of care  [x]  Update interventions per flow sheet       []  Discharge due to:_  [x]  Other:_  Likely discharge next session    Maxwell Parr, PT, DPT 12/16/2021

## 2021-12-21 ENCOUNTER — APPOINTMENT (OUTPATIENT)
Dept: PHYSICAL THERAPY | Age: 61
End: 2021-12-21
Payer: MEDICAID

## 2021-12-23 ENCOUNTER — APPOINTMENT (OUTPATIENT)
Dept: PHYSICAL THERAPY | Age: 61
End: 2021-12-23
Payer: MEDICAID

## 2021-12-28 ENCOUNTER — HOSPITAL ENCOUNTER (OUTPATIENT)
Dept: PHYSICAL THERAPY | Age: 61
Discharge: HOME | End: 2021-12-28
Payer: MEDICAID

## 2021-12-28 PROCEDURE — 97110 THERAPEUTIC EXERCISES: CPT

## 2021-12-28 NOTE — PROGRESS NOTES
PT DAILY TREATMENT NOTE  NON [facility]     Patient Name: Mandy Ware  Date:2021  : 1960  [x]  Patient  Verified  Payor: Ninoska Garcia / Plan: [address] [place] [id number] / Product Type: Managed Care Medicaid /    Treatment Area: Plantar fascial fibromatosis [M72.2]       Next MD APPT:   In time:07:45 am  Out time: 08:30 am  Total Treatment Time (min): 40  Visit #: -     SUBJECTIVE  Pain Level (0-10 scale) pre treatment: 0/10     Pain Level (0-10 scale) post treatment: 0/10  Any medication changes, allergies to medications, adverse drug reactions, diagnosis change, or new procedure performed?:   [] No    [] Yes (see summary sheet for update)  Subjective functional status/changes:   [] No changes reported  Patient stated she has not had any pain to her back or foot since wearing her orthotics again and placing heel lift in shoe. \"I just need to get some more heel lifts and new orthotics. \" She stated she has not had any pain since last session as long as she is wearing her orthotics with the lift. OBJECTIVE      40 min Therapeutic Exercise:  [x] See flow sheet :   Rationale: increase ROM, increase strength and improve balance to improve the patients ability to ambulate with less pain    With   [] TE   [] TA   [] Neuro   [] SC   [x] other: Patient Education: [] Review HEP    [] Progressed/Changed HEP based on:   [] positioning   [] body mechanics   [] transfers   [] Use of heat/ice     [x] other: Reviewed HEP        Other Objective/Functional Measures: able to perform all exercises without any difficulty or pain to foot or back. Bilateral ankle DF AROM 2-5 degrees    ASSESSMENT/Changes in Function:   Patient reporting no pain at present time to foot or back. She is complaint with orthotic and heel lift. She is looking to purchase new orthotic and heel lift when able. Reviewed HEP for her to continue on her own. Plan to discharge her at present time per PT and meeting goals. GOALS/Progress towards goals:  Patient Goal (s): relieve back pain    [x]? Met []? Not met []? Partially met   Short Term Goals: To be accomplished in 4 treatments. 1. Patient will be compliant with a progressive HEP. [x] Met [] Not met [] Partially met     -12/14  2. Patient will obtain heel lift or custom inserts. [x] Met [] Not met [] Partially met    - wearing her old inserts and heel lift 12/16  Long Term Goals: To be accomplished in 8-12 treatments. 1. Patient will be able to walk 45 minutes in stores without discomfort/pain. [] Met [x] Not met [] Partially met    - pt states she still has back pain with prolonged walking but no foot pain  2. Patient will improve ankle DF by 5 degrees.  [x] Met [] Not met [] Partially met    -  AROM now past neutral bilaterally    PLAN  []  Upgrade activities as tolerated     []  Continue plan of care  []  Update interventions per flow sheet       [x]  Discharge due to:_  []  Other:_  Likely discharge next session    Freida Andre, PTA 12/28/2021

## 2022-02-10 NOTE — PROGRESS NOTES
[address]  Ph: [phone]  Fax: [phone]    Medicaid Discharge Summary 2-15    Patient name: Sagrario Haddad  : 1960  Provider#: [id number]  Referral source: Erik Pal DPM      Medical/Treatment Diagnosis: Plantar fascial fibromatosis [M72.2]     Prior Hospitalization: see medical history     Comorbidities: See Plan of Care  Prior Level of Function: See Plan of Care  Medications: Verified on Patient Summary List    Start of Care: 12/3/21   Onset Date:(see initial plan of care)  Visits from Start of Care: 6  Missed Visits: 3  Certification Period : 12/3/21 to 3/3/22     Assessment/Summary of care/GOALS:   Patient reporting no pain at present time to foot or back at last treatment session 21. She is compliant with orthotic and heel lift. She is looking to purchase new orthotic and heel lift when able. Reviewed HEP for her to continue on her own. GOALS/Progress towards goals:  Patient Goal (s): relieve back pain    [x]? ? Met []? ? Not met []? ? Partially met   Short Term Goals: To be accomplished in 4 treatments. 1. Patient will be compliant with a progressive HEP. [x]? Met []? Not met []? Partially met                -  2. Patient will obtain heel lift or custom inserts. [x]? Met []? Not met []? Partially met               - wearing her old inserts and heel lift   Long Term Goals: To be accomplished in 8-12 treatments. 1. Patient will be able to walk 45 minutes in stores without discomfort/pain. []? Met [x]? Not met []? Partially met               - pt states she still has back pain with prolonged walking but no foot pain  2. Patient will improve ankle DF by 5 degrees. [x]? Met []? Not met []?  Partially met               -  AROM now past neutral bilaterally  Ampac Score:  29.77%    RECOMMENDATIONS:  [x]Discontinue therapy:   [x]Patient has reached or is progressing toward set goals and is independent with HEP   []Patient is non-compliant or has abdicated   []Due to lack of appreciable progress towards set goals   []Patient was hospitalized or suffered illness that impacted ability to continue therapy   []Other:   Chris Paiz PT, DPT 2/10/2022   Retain this original for your records. If you are unable to process this request in 24 hours, please contact our office.   ________________________________________________________________________  NOTE TO PHYSICIAN:  Please complete the following and FAX to: Waylon Jensen :  Fax: [phone]   ____ I have read the above report and request that my patient be discharged from therapy.     Physician's Signature:_____________________________________________________ Date:_____________Time:_____________     Tiara Ochoa DPM

## 2022-08-16 ENCOUNTER — TRANSCRIBE ORDER (OUTPATIENT)
Dept: SCHEDULING | Age: 62
End: 2022-08-16

## 2022-08-16 DIAGNOSIS — Z12.31 SCREENING MAMMOGRAM FOR HIGH-RISK PATIENT: Primary | ICD-10-CM

## 2022-10-03 ENCOUNTER — HOSPITAL ENCOUNTER (OUTPATIENT)
Dept: MAMMOGRAPHY | Age: 62
Discharge: HOME | End: 2022-10-03
Attending: FAMILY MEDICINE
Payer: MEDICAID

## 2022-10-03 DIAGNOSIS — Z12.31 SCREENING MAMMOGRAM FOR HIGH-RISK PATIENT: ICD-10-CM

## 2022-10-03 PROCEDURE — 77063 BREAST TOMOSYNTHESIS BI: CPT

## 2023-05-05 ENCOUNTER — TRANSCRIBE ORDERS (OUTPATIENT)
Facility: HOSPITAL | Age: 63
End: 2023-05-05

## 2023-05-05 DIAGNOSIS — M81.0 SENILE OSTEOPOROSIS: Primary | ICD-10-CM

## 2023-05-18 ENCOUNTER — HOSPITAL ENCOUNTER (OUTPATIENT)
Facility: HOSPITAL | Age: 63
Discharge: HOME | End: 2023-05-18
Payer: MEDICARE

## 2023-05-18 ENCOUNTER — HOSPITAL ENCOUNTER (OUTPATIENT)
Facility: HOSPITAL | Age: 63
Discharge: HOME | End: 2023-05-21

## 2023-05-18 DIAGNOSIS — Z12.31 VISIT FOR SCREENING MAMMOGRAM: ICD-10-CM

## 2023-05-18 DIAGNOSIS — M81.0 SENILE OSTEOPOROSIS: ICD-10-CM

## 2023-05-18 PROCEDURE — 77080 DXA BONE DENSITY AXIAL: CPT
